# Patient Record
Sex: FEMALE | Race: WHITE | NOT HISPANIC OR LATINO | Employment: FULL TIME | URBAN - METROPOLITAN AREA
[De-identification: names, ages, dates, MRNs, and addresses within clinical notes are randomized per-mention and may not be internally consistent; named-entity substitution may affect disease eponyms.]

---

## 2023-05-30 ENCOUNTER — HOSPITAL ENCOUNTER (EMERGENCY)
Facility: HOSPITAL | Age: 33
Discharge: HOME | End: 2023-05-30
Attending: EMERGENCY MEDICINE | Admitting: EMERGENCY MEDICINE

## 2023-05-30 VITALS
DIASTOLIC BLOOD PRESSURE: 74 MMHG | WEIGHT: 293 LBS | SYSTOLIC BLOOD PRESSURE: 155 MMHG | TEMPERATURE: 98.2 F | BODY MASS INDEX: 43.4 KG/M2 | HEIGHT: 69 IN | RESPIRATION RATE: 20 BRPM | OXYGEN SATURATION: 98 % | HEART RATE: 79 BPM

## 2023-05-30 DIAGNOSIS — S61.211A LACERATION OF LEFT INDEX FINGER WITHOUT FOREIGN BODY WITHOUT DAMAGE TO NAIL, INITIAL ENCOUNTER: Primary | ICD-10-CM

## 2023-05-30 DIAGNOSIS — M79.645 FINGER PAIN, LEFT: ICD-10-CM

## 2023-05-30 PROCEDURE — 90471 IMMUNIZATION ADMIN: CPT | Performed by: NURSE PRACTITIONER

## 2023-05-30 PROCEDURE — 0 LIDOCAINE 1 % SOLUTION: Performed by: NURSE PRACTITIONER

## 2023-05-30 PROCEDURE — 25010000002 TETANUS-DIPHTH-ACELL PERTUSSIS 5-2.5-18.5 LF-MCG/0.5 SUSPENSION PREFILLED SYRINGE: Performed by: NURSE PRACTITIONER

## 2023-05-30 PROCEDURE — 99282 EMERGENCY DEPT VISIT SF MDM: CPT

## 2023-05-30 PROCEDURE — 90715 TDAP VACCINE 7 YRS/> IM: CPT | Performed by: NURSE PRACTITIONER

## 2023-05-30 RX ORDER — BUPIVACAINE HYDROCHLORIDE 2.5 MG/ML
10 INJECTION, SOLUTION INFILTRATION; PERINEURAL ONCE
Status: DISCONTINUED | OUTPATIENT
Start: 2023-05-30 | End: 2023-05-30

## 2023-05-30 RX ORDER — LIDOCAINE HYDROCHLORIDE 10 MG/ML
10 INJECTION, SOLUTION INFILTRATION; PERINEURAL ONCE
Status: COMPLETED | OUTPATIENT
Start: 2023-05-30 | End: 2023-05-30

## 2023-05-30 RX ADMIN — LIDOCAINE HYDROCHLORIDE 10 ML: 10 INJECTION, SOLUTION INFILTRATION; PERINEURAL at 13:29

## 2023-05-30 RX ADMIN — TETANUS TOXOID, REDUCED DIPHTHERIA TOXOID AND ACELLULAR PERTUSSIS VACCINE, ADSORBED 0.5 ML: 5; 2.5; 8; 8; 2.5 SUSPENSION INTRAMUSCULAR at 13:28

## 2023-05-30 NOTE — ED PROVIDER NOTES
Subjective   History of Present Illness  33-year-old female presents with laceration to the dorsum of the  MIP left index finger status post using a knife to cut off bristles of a comb while braiding her significant other's hair.  She denies paresthesia.  She reports that its been greater than 5 years since her last tetanus shot.    Primary care: Yaneth Gonzales NP        Review of Systems    No past medical history on file.    No Known Allergies    No past surgical history on file.    No family history on file.    Social History     Socioeconomic History   • Marital status: Single           Objective   Physical Exam  Vitals and nursing note reviewed.   Constitutional:       General: She is awake. She is not in acute distress.     Appearance: Normal appearance. She is well-developed. She is obese.   Cardiovascular:      Pulses: Normal pulses.           Radial pulses are 2+ on the right side and 2+ on the left side.   Musculoskeletal:         General: Tenderness and signs of injury present. No swelling or deformity. Normal range of motion.      Left hand: Laceration present. No swelling, deformity or bony tenderness. Normal strength. Normal sensation. There is no disruption of two-point discrimination. Normal capillary refill. Normal pulse.        Hands:    Skin:     General: Skin is warm and dry.      Capillary Refill: Capillary refill takes less than 2 seconds.      Coloration: Skin is not pale.   Neurological:      Mental Status: She is alert and oriented to person, place, and time.      Sensory: No sensory deficit.      Motor: No abnormal muscle tone.   Psychiatric:         Mood and Affect: Mood normal.         Behavior: Behavior normal. Behavior is cooperative.         Thought Content: Thought content normal.         Judgment: Judgment normal.         Laceration Repair    Date/Time: 5/30/2023 1:48 PM  Performed by: Lizabeth Moreno APRN  Authorized by: Naga Singh MD     Consent:     Consent obtained:  Verbal    " Consent given by:  Patient    Risks, benefits, and alternatives were discussed: yes      Risks discussed:  Pain    Alternatives discussed:  Referral  Holley protocol:     Procedure explained and questions answered to patient or proxy's satisfaction: yes      Patient identity confirmed:  Verbally with patient and arm band  Anesthesia:     Anesthesia method:  Nerve block    Block needle gauge:  27 G    Block anesthetic:  Lidocaine 1% w/o epi    Block injection procedure:  Introduced needle, incremental injection and anatomic landmarks identified    Block outcome:  Anesthesia achieved  Laceration details:     Location:  Finger    Finger location:  L index finger    Length (cm):  1  Pre-procedure details:     Preparation:  Patient was prepped and draped in usual sterile fashion  Exploration:     Hemostasis achieved with:  Direct pressure    Wound exploration: wound explored through full range of motion      Contaminated: no    Treatment:     Area cleansed with:  Chlorhexidine    Amount of cleaning:  Standard    Irrigation solution:  Sterile saline    Irrigation method:  Tap    Visualized foreign bodies/material removed: no    Skin repair:     Repair method:  Sutures    Suture size:  5-0    Suture material:  Nylon    Suture technique:  Simple interrupted    Number of sutures:  2  Approximation:     Approximation:  Close  Repair type:     Repair type:  Simple  Post-procedure details:     Dressing:  Antibiotic ointment and splint for protection    Procedure completion:  Tolerated well, no immediate complications               ED Course                                           Medical Decision Making  Risk  Prescription drug management.        Interpreted by radiologist as below:     No radiology results for the last day      /74   Pulse 79   Temp 98.2 °F (36.8 °C)   Resp 20   Ht 175.3 cm (69\")   Wt (!) 145 kg (320 lb 1.7 oz)   LMP 04/27/2023   SpO2 98%   BMI 47.27 kg/m²      Lab Results (last 24 hours)  "    ** No results found for the last 24 hours. **           Medications   Tetanus-Diphth-Acell Pertussis (BOOSTRIX) injection 0.5 mL (0.5 mL Intramuscular Given 5/30/23 1328)   lidocaine (XYLOCAINE) 1 % injection 10 mL (10 mL Infiltration Given 5/30/23 1329)        Patient undressed and placed in gown for exam.  Appropriate PPE worn during patient exam.  Appropriate monitoring initiated. Patient is alert and oriented x3.  No acute distress noted.  1 cm laceration noted to dorsum of the  MIP left index finger.  Motor function sensation intact.  No mallet deformity noted.  Bleeding controlled per prehospital bandage.  Tdap updated.  See procedure note for suture repair.  Patient was encouraged to cleanse twice daily with antibacterial soap and water and have sutures removed in 10 days.  A splint was applied for protection of sutures.    I reviewed chart nothing available for comparison.    Disposition/Treatment: Discussed results with patient, verbalized understanding. Discussed reasons to return to the ER, patient verbalized understanding. Agreeable with plan of care. Patient was stable upon discharge.    Upon reassessment, patient is flesh tone warm and dry no acute distress noted.  Vital signs are stable.    Part of this note may be an electronic transcription/translation of spoken language to printed text using the Dragon Dictation System.         Final diagnoses:   Laceration of left index finger without foreign body without damage to nail, initial encounter   Finger pain, left       ED Disposition  ED Disposition     ED Disposition   Discharge    Condition   Stable    Comment   --             Yaneth Gonzales, APRN  [address], [address]  [facility] [address] [phone]    Schedule an appointment as soon as possible for a visit in 10 days  For suture removal    [facility] EMERGENCY DEPARTMENT  [facility] [address] [phone]  Go to   If symptoms worsen          Medication List      No changes were made to your prescriptions during this visit.          Lizabeth Moreno, APRN  05/30/23 0722

## 2023-05-30 NOTE — DISCHARGE INSTRUCTIONS
Cleanse twice daily with antibacterial soap and water then apply bacitracin bandage.  Keep splint in place for protection.  Apply ice every 2 hours while awake, for 20 minutes.  Rotate Tylenol Motrin for pain.  Do not saturate in body of water such as dirty dish water, bath water, hot tub, swimming pool.  Monitor for signs of infection including but not limited to: Redness at the site, green or yellow drainage, fever of 101 or higher.  Have sutures removed in 10 days by primary care, urgent care, or return to the ER.

## 2024-07-14 ENCOUNTER — APPOINTMENT (OUTPATIENT)
Dept: GENERAL RADIOLOGY | Facility: HOSPITAL | Age: 34
End: 2024-07-14

## 2024-07-14 ENCOUNTER — APPOINTMENT (OUTPATIENT)
Dept: MRI IMAGING | Facility: HOSPITAL | Age: 34
End: 2024-07-14

## 2024-07-14 ENCOUNTER — HOSPITAL ENCOUNTER (INPATIENT)
Facility: HOSPITAL | Age: 34
LOS: 1 days | Discharge: HOME | End: 2024-07-15
Attending: EMERGENCY MEDICINE | Admitting: INTERNAL MEDICINE

## 2024-07-14 DIAGNOSIS — D72.829 LEUKOCYTOSIS, UNSPECIFIED TYPE: ICD-10-CM

## 2024-07-14 DIAGNOSIS — M54.41 ACUTE RIGHT-SIDED LOW BACK PAIN WITH RIGHT-SIDED SCIATICA: Primary | ICD-10-CM

## 2024-07-14 DIAGNOSIS — M54.16 LUMBAR RADICULOPATHY, CHRONIC: ICD-10-CM

## 2024-07-14 PROBLEM — M54.9 BACK PAIN: Status: ACTIVE | Noted: 2024-07-14

## 2024-07-14 LAB
ALBUMIN SERPL-MCNC: 4.2 G/DL (ref 3.5–5.2)
ALBUMIN/GLOB SERPL: 1.5 G/DL
ALP SERPL-CCNC: 55 U/L (ref 39–117)
ALT SERPL W P-5'-P-CCNC: 14 U/L (ref 1–33)
ANION GAP SERPL CALCULATED.3IONS-SCNC: 13.1 MMOL/L (ref 5–15)
AST SERPL-CCNC: 13 U/L (ref 1–32)
B-HCG UR QL: NEGATIVE
BACTERIA UR QL AUTO: ABNORMAL /HPF
BASOPHILS # BLD AUTO: 0.04 10*3/MM3 (ref 0–0.2)
BASOPHILS NFR BLD AUTO: 0.2 % (ref 0–1.5)
BILIRUB SERPL-MCNC: 0.4 MG/DL (ref 0–1.2)
BILIRUB UR QL STRIP: NEGATIVE
BUN SERPL-MCNC: 15 MG/DL (ref 6–20)
BUN/CREAT SERPL: 15.3 (ref 7–25)
CALCIUM SPEC-SCNC: 8.9 MG/DL (ref 8.6–10.5)
CHLORIDE SERPL-SCNC: 101 MMOL/L (ref 98–107)
CLARITY UR: ABNORMAL
CO2 SERPL-SCNC: 23.9 MMOL/L (ref 22–29)
COLOR UR: YELLOW
CREAT SERPL-MCNC: 0.98 MG/DL (ref 0.57–1)
CRP SERPL-MCNC: <0.3 MG/DL (ref 0–0.5)
D-LACTATE SERPL-SCNC: 1.4 MMOL/L (ref 0.3–2)
DEPRECATED RDW RBC AUTO: 47 FL (ref 37–54)
EGFRCR SERPLBLD CKD-EPI 2021: 76.9 ML/MIN/1.73
EOSINOPHIL # BLD AUTO: 0.08 10*3/MM3 (ref 0–0.4)
EOSINOPHIL NFR BLD AUTO: 0.4 % (ref 0.3–6.2)
ERYTHROCYTE [DISTWIDTH] IN BLOOD BY AUTOMATED COUNT: 13.9 % (ref 12.3–15.4)
ERYTHROCYTE [SEDIMENTATION RATE] IN BLOOD: 8 MM/HR (ref 0–20)
GLOBULIN UR ELPH-MCNC: 2.8 GM/DL
GLUCOSE SERPL-MCNC: 212 MG/DL (ref 65–99)
GLUCOSE UR STRIP-MCNC: NEGATIVE MG/DL
HCT VFR BLD AUTO: 45.4 % (ref 34–46.6)
HGB BLD-MCNC: 14.6 G/DL (ref 12–15.9)
HGB UR QL STRIP.AUTO: NEGATIVE
HYALINE CASTS UR QL AUTO: ABNORMAL /LPF
IMM GRANULOCYTES # BLD AUTO: 0.22 10*3/MM3 (ref 0–0.05)
IMM GRANULOCYTES NFR BLD AUTO: 1 % (ref 0–0.5)
KETONES UR QL STRIP: ABNORMAL
LEUKOCYTE ESTERASE UR QL STRIP.AUTO: ABNORMAL
LYMPHOCYTES # BLD AUTO: 2.45 10*3/MM3 (ref 0.7–3.1)
LYMPHOCYTES NFR BLD AUTO: 11.2 % (ref 19.6–45.3)
MCH RBC QN AUTO: 29.9 PG (ref 26.6–33)
MCHC RBC AUTO-ENTMCNC: 32.2 G/DL (ref 31.5–35.7)
MCV RBC AUTO: 92.8 FL (ref 79–97)
MONOCYTES # BLD AUTO: 0.91 10*3/MM3 (ref 0.1–0.9)
MONOCYTES NFR BLD AUTO: 4.1 % (ref 5–12)
MUCOUS THREADS URNS QL MICRO: ABNORMAL /HPF
NEUTROPHILS NFR BLD AUTO: 18.24 10*3/MM3 (ref 1.7–7)
NEUTROPHILS NFR BLD AUTO: 83.1 % (ref 42.7–76)
NITRITE UR QL STRIP: NEGATIVE
NRBC BLD AUTO-RTO: 0 /100 WBC (ref 0–0.2)
PH UR STRIP.AUTO: 5.5 [PH] (ref 5–8)
PLATELET # BLD AUTO: 305 10*3/MM3 (ref 140–450)
PMV BLD AUTO: 9.4 FL (ref 6–12)
POTASSIUM SERPL-SCNC: 3.8 MMOL/L (ref 3.5–5.2)
PROCALCITONIN SERPL-MCNC: 0.03 NG/ML (ref 0–0.25)
PROT SERPL-MCNC: 7 G/DL (ref 6–8.5)
PROT UR QL STRIP: ABNORMAL
RBC # BLD AUTO: 4.89 10*6/MM3 (ref 3.77–5.28)
RBC # UR STRIP: ABNORMAL /HPF
REF LAB TEST METHOD: ABNORMAL
SODIUM SERPL-SCNC: 138 MMOL/L (ref 136–145)
SP GR UR STRIP: 1.02 (ref 1–1.03)
SQUAMOUS #/AREA URNS HPF: ABNORMAL /HPF
UROBILINOGEN UR QL STRIP: ABNORMAL
WBC # UR STRIP: ABNORMAL /HPF
WBC NRBC COR # BLD AUTO: 21.94 10*3/MM3 (ref 3.4–10.8)

## 2024-07-14 PROCEDURE — 36415 COLL VENOUS BLD VENIPUNCTURE: CPT

## 2024-07-14 PROCEDURE — 72158 MRI LUMBAR SPINE W/O & W/DYE: CPT

## 2024-07-14 PROCEDURE — 99285 EMERGENCY DEPT VISIT HI MDM: CPT

## 2024-07-14 PROCEDURE — 72156 MRI NECK SPINE W/O & W/DYE: CPT

## 2024-07-14 PROCEDURE — 72157 MRI CHEST SPINE W/O & W/DYE: CPT

## 2024-07-14 PROCEDURE — 71045 X-RAY EXAM CHEST 1 VIEW: CPT

## 2024-07-14 PROCEDURE — 81001 URINALYSIS AUTO W/SCOPE: CPT | Performed by: EMERGENCY MEDICINE

## 2024-07-14 PROCEDURE — 87040 BLOOD CULTURE FOR BACTERIA: CPT | Performed by: EMERGENCY MEDICINE

## 2024-07-14 PROCEDURE — 81025 URINE PREGNANCY TEST: CPT | Performed by: EMERGENCY MEDICINE

## 2024-07-14 PROCEDURE — 25010000002 KETOROLAC TROMETHAMINE PER 15 MG: Performed by: EMERGENCY MEDICINE

## 2024-07-14 PROCEDURE — 25010000002 GADOTERIDOL PER 1 ML: Performed by: INTERNAL MEDICINE

## 2024-07-14 PROCEDURE — 85025 COMPLETE CBC W/AUTO DIFF WBC: CPT | Performed by: EMERGENCY MEDICINE

## 2024-07-14 PROCEDURE — 83605 ASSAY OF LACTIC ACID: CPT

## 2024-07-14 PROCEDURE — A9579 GAD-BASE MR CONTRAST NOS,1ML: HCPCS | Performed by: INTERNAL MEDICINE

## 2024-07-14 PROCEDURE — 25010000002 METHYLPREDNISOLONE PER 80 MG: Performed by: EMERGENCY MEDICINE

## 2024-07-14 PROCEDURE — 86140 C-REACTIVE PROTEIN: CPT | Performed by: INTERNAL MEDICINE

## 2024-07-14 PROCEDURE — 84145 PROCALCITONIN (PCT): CPT | Performed by: INTERNAL MEDICINE

## 2024-07-14 PROCEDURE — 85652 RBC SED RATE AUTOMATED: CPT | Performed by: EMERGENCY MEDICINE

## 2024-07-14 PROCEDURE — 80053 COMPREHEN METABOLIC PANEL: CPT | Performed by: EMERGENCY MEDICINE

## 2024-07-14 RX ORDER — ONDANSETRON 2 MG/ML
4 INJECTION INTRAMUSCULAR; INTRAVENOUS EVERY 6 HOURS PRN
Status: DISCONTINUED | OUTPATIENT
Start: 2024-07-14 | End: 2024-07-15 | Stop reason: HOSPADM

## 2024-07-14 RX ORDER — ONDANSETRON 4 MG/1
4 TABLET, ORALLY DISINTEGRATING ORAL EVERY 6 HOURS PRN
Status: DISCONTINUED | OUTPATIENT
Start: 2024-07-14 | End: 2024-07-15 | Stop reason: HOSPADM

## 2024-07-14 RX ORDER — ACETAMINOPHEN 160 MG/5ML
650 SOLUTION ORAL EVERY 4 HOURS PRN
Status: DISCONTINUED | OUTPATIENT
Start: 2024-07-14 | End: 2024-07-15 | Stop reason: HOSPADM

## 2024-07-14 RX ORDER — METHYLPREDNISOLONE ACETATE 80 MG/ML
80 INJECTION, SUSPENSION INTRA-ARTICULAR; INTRALESIONAL; INTRAMUSCULAR; SOFT TISSUE ONCE
Status: COMPLETED | OUTPATIENT
Start: 2024-07-14 | End: 2024-07-14

## 2024-07-14 RX ORDER — KETOROLAC TROMETHAMINE 30 MG/ML
15 INJECTION, SOLUTION INTRAMUSCULAR; INTRAVENOUS ONCE
Status: COMPLETED | OUTPATIENT
Start: 2024-07-14 | End: 2024-07-14

## 2024-07-14 RX ORDER — IBUPROFEN 200 MG
200 TABLET ORAL EVERY 6 HOURS PRN
COMMUNITY

## 2024-07-14 RX ORDER — SODIUM CHLORIDE 0.9 % (FLUSH) 0.9 %
10 SYRINGE (ML) INJECTION AS NEEDED
Status: DISCONTINUED | OUTPATIENT
Start: 2024-07-14 | End: 2024-07-15 | Stop reason: HOSPADM

## 2024-07-14 RX ORDER — PREDNISONE 20 MG/1
80 TABLET ORAL DAILY
Status: DISCONTINUED | OUTPATIENT
Start: 2024-07-15 | End: 2024-07-15 | Stop reason: HOSPADM

## 2024-07-14 RX ORDER — HYDRALAZINE HYDROCHLORIDE 20 MG/ML
10 INJECTION INTRAMUSCULAR; INTRAVENOUS EVERY 6 HOURS PRN
Status: DISCONTINUED | OUTPATIENT
Start: 2024-07-14 | End: 2024-07-15 | Stop reason: HOSPADM

## 2024-07-14 RX ORDER — BISACODYL 10 MG
10 SUPPOSITORY, RECTAL RECTAL DAILY PRN
Status: DISCONTINUED | OUTPATIENT
Start: 2024-07-14 | End: 2024-07-15 | Stop reason: HOSPADM

## 2024-07-14 RX ORDER — ACETAMINOPHEN 650 MG/1
650 SUPPOSITORY RECTAL EVERY 4 HOURS PRN
Status: DISCONTINUED | OUTPATIENT
Start: 2024-07-14 | End: 2024-07-15 | Stop reason: HOSPADM

## 2024-07-14 RX ORDER — BISACODYL 5 MG/1
5 TABLET, DELAYED RELEASE ORAL DAILY PRN
Status: DISCONTINUED | OUTPATIENT
Start: 2024-07-14 | End: 2024-07-15 | Stop reason: HOSPADM

## 2024-07-14 RX ORDER — PANTOPRAZOLE SODIUM 40 MG/1
40 TABLET, DELAYED RELEASE ORAL
Status: DISCONTINUED | OUTPATIENT
Start: 2024-07-15 | End: 2024-07-15 | Stop reason: HOSPADM

## 2024-07-14 RX ORDER — ACETAMINOPHEN 325 MG/1
650 TABLET ORAL EVERY 4 HOURS PRN
Status: DISCONTINUED | OUTPATIENT
Start: 2024-07-14 | End: 2024-07-15 | Stop reason: HOSPADM

## 2024-07-14 RX ORDER — NITROGLYCERIN 0.4 MG/1
0.4 TABLET SUBLINGUAL
Status: DISCONTINUED | OUTPATIENT
Start: 2024-07-14 | End: 2024-07-15 | Stop reason: HOSPADM

## 2024-07-14 RX ORDER — POLYETHYLENE GLYCOL 3350 17 G/17G
17 POWDER, FOR SOLUTION ORAL DAILY PRN
Status: DISCONTINUED | OUTPATIENT
Start: 2024-07-14 | End: 2024-07-15 | Stop reason: HOSPADM

## 2024-07-14 RX ORDER — SODIUM CHLORIDE 0.9 % (FLUSH) 0.9 %
10 SYRINGE (ML) INJECTION EVERY 12 HOURS SCHEDULED
Status: DISCONTINUED | OUTPATIENT
Start: 2024-07-14 | End: 2024-07-15 | Stop reason: HOSPADM

## 2024-07-14 RX ORDER — HYDROCODONE BITARTRATE AND ACETAMINOPHEN 5; 325 MG/1; MG/1
1 TABLET ORAL EVERY 6 HOURS PRN
Status: DISCONTINUED | OUTPATIENT
Start: 2024-07-14 | End: 2024-07-15 | Stop reason: HOSPADM

## 2024-07-14 RX ORDER — ALUMINA, MAGNESIA, AND SIMETHICONE 2400; 2400; 240 MG/30ML; MG/30ML; MG/30ML
15 SUSPENSION ORAL EVERY 6 HOURS PRN
Status: DISCONTINUED | OUTPATIENT
Start: 2024-07-14 | End: 2024-07-15 | Stop reason: HOSPADM

## 2024-07-14 RX ORDER — ACETAMINOPHEN 325 MG/1
650 TABLET ORAL EVERY 6 HOURS PRN
COMMUNITY

## 2024-07-14 RX ORDER — AMOXICILLIN 250 MG
2 CAPSULE ORAL 2 TIMES DAILY PRN
Status: DISCONTINUED | OUTPATIENT
Start: 2024-07-14 | End: 2024-07-15 | Stop reason: HOSPADM

## 2024-07-14 RX ORDER — SODIUM CHLORIDE 9 MG/ML
40 INJECTION, SOLUTION INTRAVENOUS AS NEEDED
Status: DISCONTINUED | OUTPATIENT
Start: 2024-07-14 | End: 2024-07-15 | Stop reason: HOSPADM

## 2024-07-14 RX ORDER — AMLODIPINE BESYLATE 5 MG/1
5 TABLET ORAL
Status: DISCONTINUED | OUTPATIENT
Start: 2024-07-14 | End: 2024-07-14

## 2024-07-14 RX ADMIN — GADOTERIDOL 20 ML: 279.3 INJECTION, SOLUTION INTRAVENOUS at 16:59

## 2024-07-14 RX ADMIN — KETOROLAC TROMETHAMINE 15 MG: 30 INJECTION, SOLUTION INTRAMUSCULAR at 14:33

## 2024-07-14 RX ADMIN — HYDROCODONE BITARTRATE AND ACETAMINOPHEN 1 TABLET: 5; 325 TABLET ORAL at 23:10

## 2024-07-14 RX ADMIN — AMLODIPINE BESYLATE 5 MG: 5 TABLET ORAL at 18:19

## 2024-07-14 RX ADMIN — ACETAMINOPHEN 650 MG: 325 TABLET, FILM COATED ORAL at 18:19

## 2024-07-14 RX ADMIN — Medication 10 ML: at 21:09

## 2024-07-14 RX ADMIN — METHYLPREDNISOLONE ACETATE 80 MG: 80 INJECTION, SUSPENSION INTRA-ARTICULAR; INTRALESIONAL; INTRAMUSCULAR; SOFT TISSUE at 14:30

## 2024-07-14 NOTE — PLAN OF CARE
Goal Outcome Evaluation:      Pt alert and oriented x 4. Independent. Pain level 8,  administered tylenol.

## 2024-07-14 NOTE — Clinical Note
Level of Care: Telemetry [5]   Diagnosis: Back pain [313759]   Admitting Physician: JEWELL WHITNEY [[id number]]   Attending Physician: JEWELL WHITNEY [[id number]]   Certification: I Certify That Inpatient Hospital Services Are Medically Necessary For Greater Than 2 Midnights

## 2024-07-14 NOTE — H&P
History and Physical   Teena Telles : 1990 MRN:[id number] LOS:0     Reason for admission: Back pain     Assessment / Plan     #Right sided back pain with RLE parasthesia  #Neck pain with RUE parasthesia  -pt is with underlying sciatica but recently it has been worsened to the point she could not able to walk because of pain  -no fever chills rigors reported   -leucocytosis present   -CRP and procal pending   -CXR with no acute changes.   -MRI spine ordered pending     Code Status (Patient has no pulse and is not breathing): CPR (Attempt to Resuscitate)  Medical Interventions (Patient has pulse or is breathing): Full Support       Nutrition: Diet: Regular/House; Fluid Consistency: Thin (IDDSI 0)     DVT Prophylaxis: [unfilled]     History of Present illness     A 34 y.o. old female patient with PMH of sciatica presents to the hospital with complaints of excruciating back pain along with RLE pain to the point difficult to walk. She has underlying sciatica on and off. No fever chills rigors. No bladder and bowel dysfunction. She has neck pain and RUE numbness and tingling.     Will admit for radiculopathy pain.     Subjective / Review of systems     Review of Systems   Back pain right side and RLE pain     Past Medical/Surgical/Social/Family History & Allergies   No past medical history on file.   No past surgical history on file.   Social History     Socioeconomic History    Marital status: Single      No family history on file.   Allergies   Allergen Reactions    Metformin Diarrhea        Home Medications     Prior to Admission medications    Medication Sig Start Date End Date Taking? Authorizing Provider   acetaminophen (TYLENOL) 325 MG tablet Take 2 tablets by mouth Every 6 (Six) Hours As Needed for Mild Pain.    ProviderEleni MD   ibuprofen (ADVIL,MOTRIN) 200 MG tablet Take 1 tablet by mouth Every 6 (Six) Hours As Needed for Mild Pain.    ProviderEleni MD      Objective / Physical  Exam   Vital signs:  Temp: 98.5 °F (36.9 °C)  BP: 156/95  Heart Rate: 91  Resp: 16  SpO2: 97 %  Weight: (!) 148 kg (325 lb 6.4 oz)    Admission Weight: Weight: (!) 148 kg (325 lb 6.4 oz)    Physical Exam   Physical Exam  HENT:      Head: Normocephalic and atraumatic.      Nose: Nose normal.   Eyes:      Extraocular Movements: Extraocular movements intact.      Conjunctivae/sclera: Conjunctivae normal.      Pupils: Pupils are equal, round, and reactive to light.   Cardiovascular:      Rate and Rhythm: normal       Pulses: Normal pulses.      Heart sounds: Normal heart sounds.   Pulmonary:      Effort: normal      Breath sounds: normal   Abdominal:      General: Abdomen is flat. Bowel sounds are normal.      Palpations: Abdomen is soft.   Musculoskeletal:         Right back tenderness        Labs     Results from last 7 days   Lab Units 07/14/24  1425   WBC 10*3/mm3 21.94*   HEMATOCRIT % 45.4   PLATELETS 10*3/mm3 305      Results from last 7 days   Lab Units 07/14/24  1425   SODIUM mmol/L 138   POTASSIUM mmol/L 3.8   CHLORIDE mmol/L 101   CO2 mmol/L 23.9   BUN mg/dL 15   CREATININE mg/dL 0.98        Current Medications   Scheduled Meds:amLODIPine, 5 mg, Oral, Q24H  sodium chloride, 10 mL, Intravenous, Q12H         Continuous Infusions:      Tripp Benavides MD  [facility]   07/14/24   16:00 EDT

## 2024-07-14 NOTE — ED PROVIDER NOTES
Subjective   History of Present Illness  Complaint back pain    History of present 34-year-old female about 2-week history of progressively worsening low back pain rates into her right buttock and down her right leg numbness to the right leg.  She denies any injury.  It is worse with movement better with rest.  Trouble walking.  No loss of bladder or bowel function although she has been constipated.  And feels like she is urinating frequently.  No black or bloody stool no bloody urine.  She has had subjective fever and chills and sweats she denies any recent injury illness flus viruses vaccinations procedures she has no history of drug use or recent antibiotic use.  No weight loss has been noted.  No abdominal pain no vomiting or diarrhea or.  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  No vaginal discharge or pelvic pain or worry of STD.  No pregnancy concerns.      Review of Systems   Constitutional:  Positive for chills. Negative for fever.   HENT:  Negative for congestion.    Respiratory:  Negative for chest tightness and shortness of breath.    Cardiovascular:  Negative for chest pain.   Gastrointestinal:  Negative for abdominal pain and vomiting.   Genitourinary:  Negative for difficulty urinating, dysuria and vaginal discharge.   Musculoskeletal:  Positive for back pain and neck pain.   Skin:  Negative for rash and wound.   Neurological:  Positive for weakness. Negative for dizziness and light-headedness.   Psychiatric/Behavioral:  Negative for confusion.        No past medical history on file.    Allergies   Allergen Reactions    Metformin Diarrhea       No past surgical history on file.    No family history on file.    Social History     Socioeconomic History    Marital status: Single   No tobacco alcohol or drug use    Prior to Admission medications    Medication Sig Start Date End Date Taking? Authorizing Provider   acetaminophen  (TYLENOL) 325 MG tablet Take 2 tablets by mouth Every 6 (Six) Hours As Needed for Mild Pain.    Provider, MD Eleni   ibuprofen (ADVIL,MOTRIN) 200 MG tablet Take 1 tablet by mouth Every 6 (Six) Hours As Needed for Mild Pain.    Provider, MD Eleni        Objective   Physical Exam  Constitutional 34-year-old awake alert no acute distress triage vital signs reviewed.  HEENT unremarkable sclera clear mouth clear neck supple no adenopathy no JVD no meningeal signs lungs clear no retraction no use of accessory heart regular without murmur rub abdomen soft nontender good bowel sounds no peritoneal findings or other masses.  Extremities pulses equal throughout upper and lower extremities no edema no cords no Homans' sign no evidence of DVT skin is warm and dry no rashes or cellulitic changes back no direct cervical thoracic lumbar spine tenderness noted.  She has some paralumbar tenderness in the right base not red or hot there is no redness to the buttock area no as signs of an abscess or cellulitis in the buttock area good sensation no peritoneal numbness or sacral numbness.  Patient has some prostrate leg testing on the right but negative on the left.  Toes under including big toe dorsiflex plantarflex at difficulty reflexes 2+ in the left knee and ankle 1+ in the right ankle and diminished 1+ in the right knee.  No cords or Homans' sign no redness or swelling to the leg no red hot swollen hip joint.  Full range of motion to hip no evidence of septic joint no cords or Homans' sign compartments are soft palpation no fluctuance or crepitus throughout the hip or buttock area suggesting abscess.  And distally neurovascular motor sensor intact neurologic awake alert and follows commands motor strength normal no facial asymmetry speech normal reflexes 2+ symmetric throughout bicep tricep and wrist.  Motor strength all normal in the upper extremities she able walk without difficulty.  Procedures           ED Course       Results for orders placed or performed during the hospital encounter of 07/14/24   Comprehensive Metabolic Panel    Specimen: Blood   Result Value Ref Range    Glucose 212 (H) 65 - 99 mg/dL    BUN 15 6 - 20 mg/dL    Creatinine 0.98 0.57 - 1.00 mg/dL    Sodium 138 136 - 145 mmol/L    Potassium 3.8 3.5 - 5.2 mmol/L    Chloride 101 98 - 107 mmol/L    CO2 23.9 22.0 - 29.0 mmol/L    Calcium 8.9 8.6 - 10.5 mg/dL    Total Protein 7.0 6.0 - 8.5 g/dL    Albumin 4.2 3.5 - 5.2 g/dL    ALT (SGPT) 14 1 - 33 U/L    AST (SGOT) 13 1 - 32 U/L    Alkaline Phosphatase 55 39 - 117 U/L    Total Bilirubin 0.4 0.0 - 1.2 mg/dL    Globulin 2.8 gm/dL    A/G Ratio 1.5 g/dL    BUN/Creatinine Ratio 15.3 7.0 - 25.0    Anion Gap 13.1 5.0 - 15.0 mmol/L    eGFR 76.9 >60.0 mL/min/1.73   Pregnancy, Urine - Urine, Clean Catch    Specimen: Urine, Clean Catch   Result Value Ref Range    HCG, Urine QL Negative Negative   Urinalysis With Microscopic If Indicated (No Culture) - Urine, Clean Catch    Specimen: Urine, Clean Catch   Result Value Ref Range    Color, UA Yellow Yellow, Straw    Appearance, UA Cloudy (A) Clear    pH, UA 5.5 5.0 - 8.0    Specific Gravity, UA 1.025 1.005 - 1.030    Glucose, UA Negative Negative    Ketones, UA Trace (A) Negative    Bilirubin, UA Negative Negative    Blood, UA Negative Negative    Protein, UA Trace (A) Negative    Leuk Esterase, UA Trace (A) Negative    Nitrite, UA Negative Negative    Urobilinogen, UA 1.0 E.U./dL 0.2 - 1.0 E.U./dL   Sedimentation Rate    Specimen: Blood   Result Value Ref Range    Sed Rate 8 0 - 20 mm/hr   CBC Auto Differential    Specimen: Blood   Result Value Ref Range    WBC 21.94 (H) 3.40 - 10.80 10*3/mm3    RBC 4.89 3.77 - 5.28 10*6/mm3    Hemoglobin 14.6 12.0 - 15.9 g/dL    Hematocrit 45.4 34.0 - 46.6 %    MCV 92.8 79.0 - 97.0 fL    MCH 29.9 26.6 - 33.0 pg    MCHC 32.2 31.5 - 35.7 g/dL    RDW 13.9 12.3 - 15.4 %    RDW-SD 47.0 37.0 - 54.0 fl    MPV 9.4 6.0 - 12.0 fL    Platelets 305 140 -  450 10*3/mm3    Neutrophil % 83.1 (H) 42.7 - 76.0 %    Lymphocyte % 11.2 (L) 19.6 - 45.3 %    Monocyte % 4.1 (L) 5.0 - 12.0 %    Eosinophil % 0.4 0.3 - 6.2 %    Basophil % 0.2 0.0 - 1.5 %    Immature Grans % 1.0 (H) 0.0 - 0.5 %    Neutrophils, Absolute 18.24 (H) 1.70 - 7.00 10*3/mm3    Lymphocytes, Absolute 2.45 0.70 - 3.10 10*3/mm3    Monocytes, Absolute 0.91 (H) 0.10 - 0.90 10*3/mm3    Eosinophils, Absolute 0.08 0.00 - 0.40 10*3/mm3    Basophils, Absolute 0.04 0.00 - 0.20 10*3/mm3    Immature Grans, Absolute 0.22 (H) 0.00 - 0.05 10*3/mm3    nRBC 0.0 0.0 - 0.2 /100 WBC   Urinalysis, Microscopic Only - Urine, Clean Catch    Specimen: Urine, Clean Catch   Result Value Ref Range    RBC, UA None Seen None Seen, 0-2 /HPF    WBC, UA None Seen None Seen, 0-2 /HPF    Bacteria, UA Trace (A) None Seen /HPF    Squamous Epithelial Cells, UA 7-12 (A) None Seen, 0-2 /HPF    Hyaline Casts, UA None Seen None Seen /LPF    Mucus, UA Small/1+ (A) None Seen, Trace /HPF    Methodology Manual Light Microscopy      No radiology results for the last day  Medications   sodium chloride 0.9 % flush 10 mL (has no administration in time range)   sodium chloride 0.9 % flush 10 mL (has no administration in time range)   sodium chloride 0.9 % flush 10 mL (has no administration in time range)   sodium chloride 0.9 % infusion 40 mL (has no administration in time range)   nitroglycerin (NITROSTAT) SL tablet 0.4 mg (has no administration in time range)   Potassium Replacement - Follow Nurse / BPA Driven Protocol (has no administration in time range)   Magnesium Standard Dose Replacement - Follow Nurse / BPA Driven Protocol (has no administration in time range)   Phosphorus Replacement - Follow Nurse / BPA Driven Protocol (has no administration in time range)   Calcium Replacement - Follow Nurse / BPA Driven Protocol (has no administration in time range)   acetaminophen (TYLENOL) tablet 650 mg (has no administration in time range)     Or    acetaminophen (TYLENOL) 160 MG/5ML oral solution 650 mg (has no administration in time range)     Or   acetaminophen (TYLENOL) suppository 650 mg (has no administration in time range)   sennosides-docusate (PERICOLACE) 8.6-50 MG per tablet 2 tablet (has no administration in time range)     And   polyethylene glycol (MIRALAX) packet 17 g (has no administration in time range)     And   bisacodyl (DULCOLAX) EC tablet 5 mg (has no administration in time range)     And   bisacodyl (DULCOLAX) suppository 10 mg (has no administration in time range)   melatonin tablet 5 mg (has no administration in time range)   ondansetron ODT (ZOFRAN-ODT) disintegrating tablet 4 mg (has no administration in time range)     Or   ondansetron (ZOFRAN) injection 4 mg (has no administration in time range)   aluminum-magnesium hydroxide-simethicone (MAALOX MAX) 400-400-40 MG/5ML suspension 15 mL (has no administration in time range)   HYDROmorphone (DILAUDID) injection 1 mg (has no administration in time range)   HYDROcodone-acetaminophen (NORCO) 5-325 MG per tablet 1 tablet (has no administration in time range)   ketorolac (TORADOL) injection 15 mg (15 mg Intravenous Given 7/14/24 1433)   methylPREDNISolone acetate (DEPO-medrol) injection 80 mg (80 mg Intramuscular Given 7/14/24 1430)                                              Medical Decision Making  Medical decision making.  IV established monitor placement review of sinus rhythm patient was given Toradol 15 mg IV and Depo-Medrol 80 mg IM.  Labs obtained my independent review comprehensive metabolic profile unremarkable other blood sugar 212.  Pregnancy test negative urine negative sed rate was normal but white count was 21.94.  Last steroid dose was almost 2 weeks ago added blood cultures lactate was normal.  All labs independent reviewed by me.  Patient has an elevated white count I did a chest x-ray independent reviewed by me no pneumonia pneumothorax or acute findings.  I do not see  any other source of infection including I do not see pneumonia I do not see any evidence of UTI I do not see evidence of an abscess in her back or buttock area pelvic abscess or cellulitis meningitis encephalitis she has had no respiratory symptoms I see no other source of infection based on my history and physical and clinical findings currently patient will need MRI of the entire spine to rule out an abscess throughout the spine causing this pain and radiculopathy and elevated white count.  MRIs have been ordered this is pending I talked to the hospitalist I talked to the patient we discussed the case and patient will be admitted to hospital for further care stable otherwise unremarkable ER course.    Problems Addressed:  Acute right-sided low back pain with right-sided sciatica: complicated acute illness or injury  Leukocytosis, unspecified type: complicated acute illness or injury    Amount and/or Complexity of Data Reviewed  Labs: ordered. Decision-making details documented in ED Course.  Radiology: ordered.    Risk  Decision regarding hospitalization.        Final diagnoses:   Acute right-sided low back pain with right-sided sciatica   Leukocytosis, unspecified type       ED Disposition  ED Disposition       ED Disposition   Decision to Admit    Condition   --    Comment   Level of Care: Med/Surg [1]   Admitting Physician: JEWELL WHITNEY [[id number]]   Attending Physician: JEWELL WHITNEY [[id number]]                 No follow-up provider specified.       Medication List      No changes were made to your prescriptions during this visit.            Bobby Anderson MD  07/14/24 4217

## 2024-07-15 ENCOUNTER — APPOINTMENT (OUTPATIENT)
Dept: PAIN MEDICINE | Facility: HOSPITAL | Age: 34
End: 2024-07-15

## 2024-07-15 ENCOUNTER — TELEPHONE (OUTPATIENT)
Dept: NEUROSURGERY | Facility: CLINIC | Age: 34
End: 2024-07-15

## 2024-07-15 VITALS
TEMPERATURE: 97.1 F | HEIGHT: 69 IN | HEART RATE: 78 BPM | WEIGHT: 293 LBS | DIASTOLIC BLOOD PRESSURE: 96 MMHG | OXYGEN SATURATION: 97 % | SYSTOLIC BLOOD PRESSURE: 153 MMHG | RESPIRATION RATE: 16 BRPM | BODY MASS INDEX: 43.4 KG/M2

## 2024-07-15 PROBLEM — M54.16 LUMBAR RADICULOPATHY, CHRONIC: Status: ACTIVE | Noted: 2024-07-15

## 2024-07-15 PROBLEM — M54.31 SCIATIC PAIN, RIGHT: Status: ACTIVE | Noted: 2024-07-15

## 2024-07-15 LAB
ANION GAP SERPL CALCULATED.3IONS-SCNC: 8.1 MMOL/L (ref 5–15)
BASOPHILS # BLD AUTO: 0.03 10*3/MM3 (ref 0–0.2)
BASOPHILS NFR BLD AUTO: 0.1 % (ref 0–1.5)
BUN SERPL-MCNC: 20 MG/DL (ref 6–20)
BUN/CREAT SERPL: 23.5 (ref 7–25)
CALCIUM SPEC-SCNC: 8.4 MG/DL (ref 8.6–10.5)
CHLORIDE SERPL-SCNC: 105 MMOL/L (ref 98–107)
CO2 SERPL-SCNC: 24.9 MMOL/L (ref 22–29)
CREAT SERPL-MCNC: 0.85 MG/DL (ref 0.57–1)
DEPRECATED RDW RBC AUTO: 47.8 FL (ref 37–54)
EGFRCR SERPLBLD CKD-EPI 2021: 91.2 ML/MIN/1.73
EOSINOPHIL # BLD AUTO: 0.06 10*3/MM3 (ref 0–0.4)
EOSINOPHIL NFR BLD AUTO: 0.3 % (ref 0.3–6.2)
ERYTHROCYTE [DISTWIDTH] IN BLOOD BY AUTOMATED COUNT: 14 % (ref 12.3–15.4)
GLUCOSE SERPL-MCNC: 136 MG/DL (ref 65–99)
HCT VFR BLD AUTO: 37.6 % (ref 34–46.6)
HGB BLD-MCNC: 12.2 G/DL (ref 12–15.9)
IMM GRANULOCYTES # BLD AUTO: 0.17 10*3/MM3 (ref 0–0.05)
IMM GRANULOCYTES NFR BLD AUTO: 0.8 % (ref 0–0.5)
LYMPHOCYTES # BLD AUTO: 4.3 10*3/MM3 (ref 0.7–3.1)
LYMPHOCYTES NFR BLD AUTO: 21.3 % (ref 19.6–45.3)
MAGNESIUM SERPL-MCNC: 2.4 MG/DL (ref 1.6–2.6)
MCH RBC QN AUTO: 30.1 PG (ref 26.6–33)
MCHC RBC AUTO-ENTMCNC: 32.4 G/DL (ref 31.5–35.7)
MCV RBC AUTO: 92.8 FL (ref 79–97)
MONOCYTES # BLD AUTO: 1.07 10*3/MM3 (ref 0.1–0.9)
MONOCYTES NFR BLD AUTO: 5.3 % (ref 5–12)
NEUTROPHILS NFR BLD AUTO: 14.6 10*3/MM3 (ref 1.7–7)
NEUTROPHILS NFR BLD AUTO: 72.2 % (ref 42.7–76)
NRBC BLD AUTO-RTO: 0 /100 WBC (ref 0–0.2)
PHOSPHATE SERPL-MCNC: 4.3 MG/DL (ref 2.5–4.5)
PLATELET # BLD AUTO: 277 10*3/MM3 (ref 140–450)
PMV BLD AUTO: 9.6 FL (ref 6–12)
POTASSIUM SERPL-SCNC: 4.4 MMOL/L (ref 3.5–5.2)
RBC # BLD AUTO: 4.05 10*6/MM3 (ref 3.77–5.28)
SODIUM SERPL-SCNC: 138 MMOL/L (ref 136–145)
WBC NRBC COR # BLD AUTO: 20.23 10*3/MM3 (ref 3.4–10.8)

## 2024-07-15 PROCEDURE — 83735 ASSAY OF MAGNESIUM: CPT | Performed by: INTERNAL MEDICINE

## 2024-07-15 PROCEDURE — 84100 ASSAY OF PHOSPHORUS: CPT | Performed by: INTERNAL MEDICINE

## 2024-07-15 PROCEDURE — 80048 BASIC METABOLIC PNL TOTAL CA: CPT | Performed by: INTERNAL MEDICINE

## 2024-07-15 PROCEDURE — 77003 FLUOROGUIDE FOR SPINE INJECT: CPT

## 2024-07-15 PROCEDURE — 25010000002 BUPIVACAINE (PF) 0.25 % SOLUTION: Performed by: STUDENT IN AN ORGANIZED HEALTH CARE EDUCATION/TRAINING PROGRAM

## 2024-07-15 PROCEDURE — 99254 IP/OBS CNSLTJ NEW/EST MOD 60: CPT

## 2024-07-15 PROCEDURE — 97161 PT EVAL LOW COMPLEX 20 MIN: CPT

## 2024-07-15 PROCEDURE — 63710000001 PREDNISONE PER 1 MG: Performed by: INTERNAL MEDICINE

## 2024-07-15 PROCEDURE — 62323 NJX INTERLAMINAR LMBR/SAC: CPT | Performed by: STUDENT IN AN ORGANIZED HEALTH CARE EDUCATION/TRAINING PROGRAM

## 2024-07-15 PROCEDURE — B01B1ZZ FLUOROSCOPY OF SPINAL CORD USING LOW OSMOLAR CONTRAST: ICD-10-PCS | Performed by: STUDENT IN AN ORGANIZED HEALTH CARE EDUCATION/TRAINING PROGRAM

## 2024-07-15 PROCEDURE — 85025 COMPLETE CBC W/AUTO DIFF WBC: CPT | Performed by: INTERNAL MEDICINE

## 2024-07-15 PROCEDURE — 25010000002 METHYLPREDNISOLONE PER 40 MG: Performed by: STUDENT IN AN ORGANIZED HEALTH CARE EDUCATION/TRAINING PROGRAM

## 2024-07-15 PROCEDURE — 3E0R33Z INTRODUCTION OF ANTI-INFLAMMATORY INTO SPINAL CANAL, PERCUTANEOUS APPROACH: ICD-10-PCS | Performed by: STUDENT IN AN ORGANIZED HEALTH CARE EDUCATION/TRAINING PROGRAM

## 2024-07-15 PROCEDURE — 25510000001 IOPAMIDOL 41 % SOLUTION: Performed by: STUDENT IN AN ORGANIZED HEALTH CARE EDUCATION/TRAINING PROGRAM

## 2024-07-15 RX ORDER — IOPAMIDOL 408 MG/ML
3 INJECTION, SOLUTION INTRATHECAL
Status: COMPLETED | OUTPATIENT
Start: 2024-07-15 | End: 2024-07-15

## 2024-07-15 RX ORDER — HYDROCODONE BITARTRATE AND ACETAMINOPHEN 5; 325 MG/1; MG/1
1 TABLET ORAL EVERY 6 HOURS PRN
Qty: 12 TABLET | Refills: 0 | Status: SHIPPED | OUTPATIENT
Start: 2024-07-15 | End: 2024-07-18

## 2024-07-15 RX ORDER — LIDOCAINE HYDROCHLORIDE 10 MG/ML
1 INJECTION, SOLUTION INFILTRATION; PERINEURAL ONCE
Status: DISCONTINUED | OUTPATIENT
Start: 2024-07-15 | End: 2024-07-15 | Stop reason: HOSPADM

## 2024-07-15 RX ORDER — METHYLPREDNISOLONE ACETATE 40 MG/ML
40 INJECTION, SUSPENSION INTRA-ARTICULAR; INTRALESIONAL; INTRAMUSCULAR; SOFT TISSUE ONCE
Status: COMPLETED | OUTPATIENT
Start: 2024-07-15 | End: 2024-07-15

## 2024-07-15 RX ORDER — BUPIVACAINE HYDROCHLORIDE 2.5 MG/ML
10 INJECTION, SOLUTION EPIDURAL; INFILTRATION; INTRACAUDAL ONCE
Status: COMPLETED | OUTPATIENT
Start: 2024-07-15 | End: 2024-07-15

## 2024-07-15 RX ORDER — METHYLPREDNISOLONE 4 MG/1
TABLET ORAL
Qty: 21 TABLET | Refills: 0 | Status: SHIPPED | OUTPATIENT
Start: 2024-07-15

## 2024-07-15 RX ADMIN — PREDNISONE 80 MG: 20 TABLET ORAL at 10:07

## 2024-07-15 RX ADMIN — PANTOPRAZOLE SODIUM 40 MG: 40 TABLET, DELAYED RELEASE ORAL at 05:24

## 2024-07-15 RX ADMIN — IOPAMIDOL 3 ML: 408 INJECTION, SOLUTION INTRATHECAL at 14:05

## 2024-07-15 RX ADMIN — BUPIVACAINE HYDROCHLORIDE 4 ML: 2.5 INJECTION, SOLUTION EPIDURAL; INFILTRATION; INTRACAUDAL; PERINEURAL at 14:05

## 2024-07-15 RX ADMIN — Medication 10 ML: at 10:07

## 2024-07-15 RX ADMIN — METHYLPREDNISOLONE ACETATE 40 MG: 40 INJECTION, SUSPENSION INTRA-ARTICULAR; INTRALESIONAL; INTRAMUSCULAR; INTRASYNOVIAL; SOFT TISSUE at 14:05

## 2024-07-15 NOTE — TELEPHONE ENCOUNTER
Please set up appointment for patient to see [facility] ASAP once discharged.  Thank you

## 2024-07-15 NOTE — SIGNIFICANT NOTE
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 No abnormal enhancement or evidence of infection     Spine surgeon consulted and nursing team is texted to page the oncall team stat.   Steroids was given in the ED.       Tripp Benavides MD  Hospitalist  07/14/24   20:36 EDT

## 2024-07-15 NOTE — PLAN OF CARE
Problem: Adult Inpatient Plan of Care  Goal: Plan of Care Review  Outcome: Ongoing, Progressing  Goal: Patient-Specific Goal (Individualized)  Outcome: Ongoing, Progressing  Goal: Absence of Hospital-Acquired Illness or Injury  Outcome: Ongoing, Progressing  Intervention: Identify and Manage Fall Risk  Recent Flowsheet Documentation  Taken 7/15/2024 1228 by Gilda Leahy RN  Safety Promotion/Fall Prevention:   assistive device/personal items within reach   clutter free environment maintained   nonskid shoes/slippers when out of bed   room organization consistent   safety round/check completed  Taken 7/15/2024 1000 by Gilda Leahy RN  Safety Promotion/Fall Prevention:   assistive device/personal items within reach   clutter free environment maintained   nonskid shoes/slippers when out of bed   room organization consistent   safety round/check completed  Taken 7/15/2024 0845 by Gilda Leahy RN  Safety Promotion/Fall Prevention:   assistive device/personal items within reach   clutter free environment maintained   nonskid shoes/slippers when out of bed   room organization consistent   safety round/check completed  Intervention: Prevent Skin Injury  Recent Flowsheet Documentation  Taken 7/15/2024 0845 by Gilda Leahy RN  Skin Protection:   adhesive use limited   tubing/devices free from skin contact  Intervention: Prevent and Manage VTE (Venous Thromboembolism) Risk  Recent Flowsheet Documentation  Taken 7/15/2024 1228 by Gilda Leahy RN  Range of Motion: active ROM (range of motion) encouraged  Taken 7/15/2024 0845 by Gilda Leahy RN  VTE Prevention/Management:   bilateral   sequential compression devices off   patient refused intervention  Range of Motion: active ROM (range of motion) encouraged  Intervention: Prevent Infection  Recent Flowsheet Documentation  Taken 7/15/2024 1228 by Gilda Leahy RN  Infection Prevention: hand hygiene promoted  Taken 7/15/2024 1000 by  Gilda Leahy RN  Infection Prevention: hand hygiene promoted  Taken 7/15/2024 0845 by Gilda Leahy RN  Infection Prevention: hand hygiene promoted  Goal: Optimal Comfort and Wellbeing  Outcome: Ongoing, Progressing  Intervention: Provide Person-Centered Care  Recent Flowsheet Documentation  Taken 7/15/2024 1228 by Gilda Leahy RN  Trust Relationship/Rapport:   care explained   thoughts/feelings acknowledged  Taken 7/15/2024 0845 by Gilda Leahy RN  Trust Relationship/Rapport:   care explained   thoughts/feelings acknowledged  Goal: Readiness for Transition of Care  Outcome: Ongoing, Progressing   Goal Outcome Evaluation:

## 2024-07-15 NOTE — PROCEDURES
Lumbar Epidural Steroid Injection  [facility]    PREOPERATIVE DIAGNOSIS:   Chronic low back pain, Lumbar Disc Displacement, and Lumbar Radiculopathy  POSTOPERATIVE DIAGNOSIS:  Same as preop diagnosis    PROCEDURE:   Lumbar Epidural Steroid Injection, Therapeutic Translaminar Injection, with epidurogram, at  L5/S1 level    PRE-PROCEDURE DISCUSSION WITH PATIENT:    Risks and complications were discussed with the patient prior to starting the procedure and informed consent was obtained.  We discussed various topics including but not limited to bleeding, infection, injury, paralysis, nerve injury, dural puncture, coma, death, worsening of clinical picture, lack of pain relief, and postprocedural soreness.    SURGEON:  Jean Ratliff MD    REASON FOR PROCEDURE:    Degenerative changes are noted in the area., Stenotic area is noted, and is likely contributing to this chronic &/or recurrent pain. , and Radicular pain pattern seems consistent with this dermatome.    SEDATION:  Patient declined administration of moderate sedation    ANESTHETIC:  Marcaine 0.25%  STEROID:   Methylprednisolone (DEPO MEDROL) 40mg/ml    DESCRIPTON OF PROCEDURE:    After obtaining informed consent, I.V. was not started in the preop area.   The patient was taken to the operating room and placed in the prone position. All pressure points were well padded.  The lumbar spine area was prepped with Chloraprep and draped in a sterile fashion.  Under fluoroscopic guidance, the above mentioned interlaminar space was identified. Skin and subcutaneous tissues were anesthetized with 1% lidocaine in the middle of the space. A Tuohy needle was introduced through the skin and advanced to this interlaminar space and into the epidural space under fluoroscopic guidance and verified with loss-of-resistance technique to air.  After confirming the position of the needle with the fluoroscope with all the views, and after aspiration was confirmed negative for  blood and CSF, 1.5 mL of Omnipaque was injected.  After seeing appropriate epidurogram with lateral and PA views, a total of 4 cc solution was injected, consisting of 3cc of local anesthetic as above, with normal saline and injectable steroid as above.     ESTIMATED BLOOD LOSS:  <5 mL  SPECIMENS:  None    COMPLICATIONS:     No complications were noted., There was no indication of vascular uptake on live injection of contrast dye., There was no indication of intrathecal uptake on live injection of contrast dye., and There was not any evidence of dural puncture.      TOLERANCE & DISCHARGE CONDITION:    The patient tolerated the procedure well.  The patient was transported to the recovery area without difficulties.  The patient was discharged to home under the care of family in stable and satisfactory condition.    PLAN OF CARE:  The patient was given our standard instruction sheet.  The patient will Return to clinic PRN  The patient will resume all medications as per the medication reconciliation sheet.

## 2024-07-15 NOTE — PLAN OF CARE
Goal Outcome Evaluation:Patient pleasant and cooperative. Had Hydrocodone during night which helped with pain. Sleeping between care. Spine surgeon was consulted and said they will see her this morning.

## 2024-07-15 NOTE — CASE MANAGEMENT/SOCIAL WORK
Discharge Planning Assessment  [facility]     Patient Name: Teena Telles  MRN: [id number]  Today's Date: 7/15/2024    Admit Date: 7/14/2024    Plan: DC PLAN: Routine home     Discharge Needs Assessment       Row Name 07/15/24 1342       Living Environment    Name(s) of People in Home Lives with daughter    Current Living Arrangements home    Potentially Unsafe Housing Conditions none    In the past 12 months has the electric, gas, oil, or water company threatened to shut off services in your home? No    Primary Care Provided by self    Provides Primary Care For no one    Family Caregiver if Needed spouse    Quality of Family Relationships helpful;involved;supportive    Able to Return to Prior Arrangements yes       Resource/Environmental Concerns    Resource/Environmental Concerns none    Transportation Concerns none       Transportation Needs    In the past 12 months, has lack of transportation kept you from medical appointments or from getting medications? no    In the past 12 months, has lack of transportation kept you from meetings, work, or from getting things needed for daily living? No       Food Insecurity    Within the past 12 months, you worried that your food would run out before you got the money to buy more. Never true    Within the past 12 months, the food you bought just didn't last and you didn't have money to get more. Never true       Transition Planning    Patient/Family Anticipates Transition to home with family    Patient/Family Anticipated Services at Transition none    Transportation Anticipated car, drives self;family or friend will provide       Discharge Needs Assessment    Readmission Within the Last 30 Days no previous admission in last 30 days    Equipment Currently Used at Home none    Anticipated Changes Related to Illness none    Equipment Needed After Discharge none                   Discharge Plan       Row Name 07/15/24 1342       Plan    Plan DC PLAN: Routine home    Patient/Family  in Agreement with Plan yes    Plan Comments Met with patient at bedside, from routine home with daughter. Independent with ADL's no DME. PCP is [facility], pharmacy is [facility]. Able to afford medications and denies any issues with food or utilities. Denies any transportation issues, still drives and works. Denies any HHC or SNF needs. Denies any concerns about retun home. To follow up with Pain Management, may DC today.  Listed as uninsured, patient states she is over income                  Continued Care and Services - Admitted Since 7/14/2024    No active coordination exists for this encounter.       Expected Discharge Date and Time       Expected Discharge Date Expected Discharge Time    Jul 15, 2024            Demographic Summary       Row Name 07/15/24 1342       General Information    Admission Type inpatient    Arrived From emergency department    Required Notices Provided Important Message from Medicare    Referral Source admission list    Reason for Consult discharge planning    Preferred Language English      Row Name 07/15/24 1145       General Information    Reason for Consult insurance concerns    General Information Comments WILBERT reviewed chart and noted pt is listed without health coverage. Per MA, pt is over-income for Medicaid. WILBERT met with pt at bedside in room 203 to discuss further. Pt is employed FT with [employer] and reports employer-sponsored health coverage is too expensive. WILBERT placed info for applying for HIP on AVS. Pt agreeable to establishing care with PCP via [employer] - WILBERT obtained appt and placed info on AVS. Updated PCP in Epic. Pt denies being on routine meds but declined M2B, requesting they be sent to [facility] -pharmacy updated. Pt drives and reports she will have transportation at d/c. Pt denies further needs at this time.                   Functional Status       Row Name 07/15/24 1342       Functional Status    Usual Activity Tolerance excellent    Current Activity Tolerance  excellent       Physical Activity    On average, how many days per week do you engage in moderate to strenuous exercise (like a brisk walk)? 0 days    On average, how many minutes do you engage in exercise at this level? 0 min    Number of minutes of exercise per week 0       Assessment of Health Literacy    How often do you have someone help you read hospital materials? Sometimes    How often do you have problems learning about your medical condition because of difficulty understanding written information? Sometimes    How often do you have a problem understanding what is told to you about your medical condition? Sometimes    How confident are you filling out medical forms by yourself? Somewhat    Health Literacy Moderate       Functional Status, IADL    Medications independent    Meal Preparation independent    Housekeeping independent    Laundry independent    Shopping independent       Mental Status    General Appearance WDL WDL       Mental Status Summary    Recent Changes in Mental Status/Cognitive Functioning no changes                    Alexandrea Sullivan, RN

## 2024-07-15 NOTE — DISCHARGE INSTRUCTIONS

## 2024-07-15 NOTE — PLAN OF CARE
Goal Outcome Evaluation:  Plan of Care Reviewed With: patient           Outcome Evaluation: 35 yo female adm 7/14/24 for acute, severe LBP and RLE radiculopathy. Pt was unable to amb at time of admission. MRI of lumbar spine (+) for disc herniation abutting several descending cauda equina nn roots. C spine MRI is (-) for any significant findings.Neurosx has seen pt and is planning epidural injections. No PMH. 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 Today, pt is having 7/10 pain in her RLE only. Denies LBP. Able to come to sitting independently. Pt comes to stand and ambulates 100 ft w/ supervision only. She has a markedly antalgic gait and is protective of her RLE due to pain. Has mild weakness in R hip. Recommend home w/ OP PT at d/c. Will follow 3xwk.

## 2024-07-15 NOTE — THERAPY EVALUATION
Patient Name: Teena Telles  : 1990    MRN: [id number]                              Today's Date: 7/15/2024       Admit Date: 2024    Visit Dx:     ICD-10-CM ICD-9-CM   1. Acute right-sided low back pain with right-sided sciatica  M54.41 724.2     724.3   2. Leukocytosis, unspecified type  D72.829 288.60     Patient Active Problem List   Diagnosis    Back pain    Lumbar radiculopathy, chronic    Sciatic pain, right     History reviewed. No pertinent past medical history.  History reviewed. No pertinent surgical history.   General Information       Row Name 07/15/24 1055          Physical Therapy Time and Intention    Document Type evaluation  -CM     Mode of Treatment physical therapy  -CM       Row Name 07/15/24 1055          General Information    Patient Profile Reviewed yes  -CM     Prior Level of Function independent:;gait;ADL's;driving;work  works as medical assistant  -CM     Existing Precautions/Restrictions spinal  -CM       Row Name 07/15/24 1055          Living Environment    People in Home child(konstantin), dependent  15 yo dtr  -CM       Row Name 07/15/24 1055          Home Main Entrance    Number of Stairs, Main Entrance four  -CM     Stair Railings, Main Entrance railings safe and in good condition  -CM       Row Name 07/15/24 1055          Stairs Within Home, Primary    Number of Stairs, Within Home, Primary none  -CM       Row Name 07/15/24 1055          Cognition    Orientation Status (Cognition) oriented x 4  -CM       Row Name 07/15/24 1055          Safety Issues, Functional Mobility    Impairments Affecting Function (Mobility) strength;pain  -CM               User Key  (r) = Recorded By, (t) = Taken By, (c) = Cosigned By      Initials Name Provider Type    CM Judie Hernandez, PT Physical Therapist                   Mobility       Row Name 07/15/24 1056          Bed Mobility    Bed Mobility bed mobility (all) activities  -CM     All Activities, [place] (Bed Mobility) independent   -CM       Row Name 07/15/24 1056          Sit-Stand Transfer    Sit-Stand [place] (Transfers) supervision  -       Row Name 07/15/24 1056          Gait/Stairs (Locomotion)    [place] Level (Gait) supervision  -     Patient was able to Ambulate yes  -CM     Distance in Feet (Gait) 100  -CM     Deviations/Abnormal Patterns (Gait) antalgic  toe touch prior to heel strike on RLE; pt very protective of RLE; decreased stance on RLE 2* pain  -CM               User Key  (r) = Recorded By, (t) = Taken By, (c) = Cosigned By      Initials Name Provider Type    CM Judie Hernandez, PT Physical Therapist                   Obj/Interventions       Row Name 07/15/24 1057          Range of Motion Comprehensive    General Range of Motion no range of motion deficits identified  -CM     Comment, General Range of Motion full ROM for cervical spine (flexion, extension, rotation, lateral flexion) w/o onset of symptoms  -CM       Row Name 07/15/24 1057          Strength Comprehensive (MMT)    General Manual Muscle Testing (MMT) Assessment lower extremity strength deficits identified  -CM     Comment, General Manual Muscle Testing (MMT) Assessment R hip 4-/5; other RLE mm groups limited to 4/5 due to pain  -       Row Name 07/15/24 1057          Balance    Balance Assessment sitting static balance;sitting dynamic balance;standing static balance;standing dynamic balance  -CM     Static Sitting Balance independent  -CM     Dynamic Sitting Balance independent  -CM     Position, Sitting Balance unsupported;sitting edge of bed  -CM     Static Standing Balance independent  -CM     Dynamic Standing Balance independent  -CM     Position/Device Used, Standing Balance unsupported  -CM       Row Name 07/15/24 1057          Sensory Assessment (Somatosensory)    Sensory Assessment (Somatosensory) sensation intact;other (see comments)  reports she was having numbness in RUE but this is now resolved.  -CM               User Key  (r) =  Recorded By, (t) = Taken By, (c) = Cosigned By      Initials Name Provider Type    CM Judie Hernandez, PT Physical Therapist                   Goals/Plan       Row Name 07/15/24 1105          Transfer Goal 1 (PT)    Activity/Assistive Device (Transfer Goal 1, PT) transfers, all  -CM     [place] Level/Cues Needed (Transfer Goal 1, PT) independent  -CM     Time Frame (Transfer Goal 1, PT) 2 weeks  -CM       Row Name 07/15/24 1105          Gait Training Goal 1 (PT)    Activity/Assistive Device (Gait Training Goal 1, PT) gait (walking locomotion)  -CM     [place] Level (Gait Training Goal 1, PT) independent  -CM     Distance (Gait Training Goal 1, PT) 200 ft w/ equal stance time and normal gait  -CM     Time Frame (Gait Training Goal 1, PT) 2 weeks  -CM       Row Name 07/15/24 1105          Therapy Assessment/Plan (PT)    Planned Therapy Interventions (PT) balance training;gait training;home exercise program;patient/family education;postural re-education;ROM (range of motion);strengthening;transfer training  -CM               User Key  (r) = Recorded By, (t) = Taken By, (c) = Cosigned By      Initials Name Provider Type    Judie Marx, PT Physical Therapist                   Clinical Impression       Row Name 07/15/24 1052          Pain    Pretreatment Pain Rating 7/10  -CM     Posttreatment Pain Rating 7/10  -CM     Pain Location - Side/Orientation Right  -CM     Pain Location lower  -CM     Pain Location - extremity  -CM     Pre/Posttreatment Pain Comment reports pain is only in RLE at this time; denies LBP  -CM     Pain Intervention(s) Repositioned;Ambulation/increased activity;Emotional support  -       Row Name 07/15/24 105          Plan of Care Review    Plan of Care Reviewed With patient  -CM     Outcome Evaluation 35 yo female adm 7/14/24 for acute, severe LBP and RLE radiculopathy. Pt was unable to amb at time of admission. MRI of lumbar spine (+) for disc herniation abutting several  descending cauda equina nn roots. C spine MRI is (-) for any significant findings.Neurosx has seen pt and is planning epidural injections. No PMH. 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 Today, pt is having 7/10 pain in her RLE only. Denies LBP. Able to come to sitting independently. Pt comes to stand and ambulates 100 ft w/ supervision only. She has a markedly antalgic gait and is protective of her RLE due to pain. Has mild weakness in R hip. Recommend home w/ OP PT at d/c. Will follow 3xwk.  -CM       Row Name 07/15/24 1105          Therapy Assessment/Plan (PT)    Rehab Potential (PT) good, to achieve stated therapy goals  -     Criteria for Skilled Interventions Met (PT) yes;meets criteria;skilled treatment is necessary  -     Therapy Frequency (PT) 3 times/wk  -CM     Predicted Duration of Therapy Intervention (PT) until d/c or goals met  -       Row Name 07/15/24 1105          Vital Signs    O2 Delivery Pre Treatment room air  -CM     O2 Delivery Intra Treatment room air  -CM     O2 Delivery Post Treatment room air  -CM       Row Name 07/15/24 1105          Positioning and Restraints    Pre-Treatment Position in bed  -CM     Post Treatment Position bed  -CM     In Bed notified nsg;fowlers;call light within reach;encouraged to call for assist  -               User Key  (r) = Recorded By, (t) = Taken By, (c) = Cosigned By      Initials Name Provider Type    Judie Marx, PT Physical Therapist                   Outcome Measures       Row Name 07/15/24 1106 07/15/24 0845       How much help from another person do you currently need...    Turning from your back to your side while in flat bed without using bedrails? 4  -CM 4  -EV    Moving from lying on back to sitting on the side of a flat bed without bedrails? 4  -CM 4  -EV    Moving to and from a bed to a chair (including a wheelchair)? 4  -CM 4  -EV    Standing up from a chair  using your arms (e.g., wheelchair, bedside chair)? 4  -CM 4  -EV    Climbing 3-5 steps with a railing? 3  -CM 4  -EV    To walk in hospital room? 4  -CM 4  -EV    AM-PAC 6 Clicks Score (PT) 23  -CM 24  -EV    Highest Level of Mobility Goal 7 --> Walk 25 feet or more  -CM 8 --> Walked 250 feet or more  -EV              User Key  (r) = Recorded By, (t) = Taken By, (c) = Cosigned By      Initials Name Provider Type    CM Judie Hernandez, PT Physical Therapist    Gilda Farris, RN Registered Nurse                                 Physical Therapy Education       Title: PT OT SLP Therapies (Done)       Topic: Physical Therapy (Done)       Point: Mobility training (Done)       Learning Progress Summary             Patient Acceptance, E,TB,D, VU,DU by  at 7/15/2024 1106                                         User Key       Initials Effective Dates Name Provider Type Discipline     06/16/21 -  Judie Hernandez, PT Physical Therapist PT                  PT Recommendation and Plan  Planned Therapy Interventions (PT): balance training, gait training, home exercise program, patient/family education, postural re-education, ROM (range of motion), strengthening, transfer training  Plan of Care Reviewed With: patient  Outcome Evaluation: 35 yo female adm 7/14/24 for acute, severe LBP and RLE radiculopathy. Pt was unable to amb at time of admission. MRI of lumbar spine (+) for disc herniation abutting several descending cauda equina nn roots. C spine MRI is (-) for any significant findings.Neurosx has seen pt and is planning epidural injections. No PMH. 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 Today, pt is having 7/10 pain in her RLE only. Denies LBP. Able to come to sitting independently. Pt comes to stand and ambulates 100 ft w/ supervision only. She has a markedly antalgic gait and is protective of her RLE due to pain. Has mild weakness in R hip.  Recommend home w/ OP PT at d/c. Will follow 3xwk.     Time Calculation:   PT Evaluation Complexity  History, PT Evaluation Complexity: no personal factors and/or comorbidities  Examination of Body Systems (PT Eval Complexity): total of 4 or more elements  Clinical Presentation (PT Evaluation Complexity): evolving  Clinical Decision Making (PT Evaluation Complexity): low complexity  Overall Complexity (PT Evaluation Complexity): low complexity     PT Charges       Row Name 07/15/24 1107             Time Calculation    Start Time 0922  -CM      Stop Time 0933  -CM      Time Calculation (min) 11 min  -CM      PT Received On 07/15/24  -CM      PT - Next Appointment 07/17/24  -CM      PT Goal Re-Cert Due Date 07/29/24  -CM         Time Calculation- PT    Total Timed Code Minutes- PT 0 minute(s)  -CM                User Key  (r) = Recorded By, (t) = Taken By, (c) = Cosigned By      Initials Name Provider Type    CM Judie Hernandez, PT Physical Therapist                  Therapy Charges for Today       Code Description Service Date Service Provider Modifiers Qty    [id number]  PT EVAL LOW COMPLEXITY 3 7/15/2024 Judie Hernandez, PT GP 1            PT G-Codes  AM-PAC 6 Clicks Score (PT): 23  PT Discharge Summary  Anticipated Discharge Disposition (PT): home with outpatient therapy services    Judie Hernandez, PT  7/15/2024

## 2024-07-15 NOTE — DISCHARGE SUMMARY
[facility] Medicine Services  Discharge Summary    Date of Service: 7-  Patient Name: Teena Telles  : 1990  MRN: [id number]    Date of Admission: 2024  Discharge Diagnosis: Lumbar radiculopathy  Date of Discharge: 7-  Primary Care Physician: Alexandrea Ortiz APRN      Presenting Problem:   Back pain [M54.9]  Acute right-sided low back pain with right-sided sciatica [M54.41]  Leukocytosis, unspecified type [D72.829]    Active and Resolved Hospital Problems:  Active Hospital Problems    Diagnosis POA    **Back pain [M54.9] Yes    Lumbar radiculopathy, chronic [M54.16] Unknown    Sciatic pain, right [M54.31] Unknown      Resolved Hospital Problems   No resolved problems to display.         Hospital Course     HPI:  Per the H&P     Hospital Course:  Patient was originally admitted after arriving to the ED with complaints of right-sided back pain with right lower extremity paresthesia as well as neck pain with right upper extremity paresthesia.  Patient has a history of sciatica however it started to worsen to the point where she could not walk.  MRI spine was ordered which showed disc herniation at L5-S1 resulting several cauda equina nerve roots.  Neurosurgery was consulted by admitting provider and steroids were given in the ED.  Per neurosurgery, patient is neurologically intact.  Patient desires to get home soon as possible.  Neurosurgery gave the option to do an epidural injection and then follow-up outpatient.  Pain management was consulted and patient is to have epidural injection this afternoon.  Patient is to follow-up in the office for neurosurgery as soon as possible after discharge to come up with treatment plan.  Patient is medically stable for discharge at this time with close outpatient monitoring.    Primary nurse has been informed discharge is to be held until after epidural injection, primary nurses to find out from provider performing epidural injection on  timeline for discharge status post epidural.  Noted leukocytosis however patient received steroids in the ED and will be placed on a Medrol Dosepak at discharge.        DISCHARGE Follow Up Recommendations for labs and diagnostics: PCP in 1 week  Neurosurgery as scheduled, ASAP      Reasons For Change In Medications and Indications for New Medications:  Norco and Medrol Dosepak for lumbar radiculopathy    Day of Discharge     Vital Signs:  Temp:  [98 °F (36.7 °C)-98.6 °F (37 °C)] 98.6 °F (37 °C)  Heart Rate:  [56-99] 86  Resp:  [10-22] 21  BP: (104-177)/() 113/63    Physical Exam:  Physical Exam  Constitutional:       Appearance: She is obese.   HENT:      Head: Normocephalic and atraumatic.      Nose: Nose normal.      Mouth/Throat:      Mouth: Mucous membranes are moist.      Pharynx: Oropharynx is clear.   Eyes:      Extraocular Movements: Extraocular movements intact.      Conjunctiva/sclera: Conjunctivae normal.      Pupils: Pupils are equal, round, and reactive to light.   Cardiovascular:      Rate and Rhythm: Normal rate and regular rhythm.      Pulses: Normal pulses.   Pulmonary:      Effort: Pulmonary effort is normal.   Abdominal:      General: Abdomen is flat.      Palpations: Abdomen is soft.   Musculoskeletal:         General: Normal range of motion.      Cervical back: Normal range of motion and neck supple.   Skin:     General: Skin is warm and dry.   Neurological:      General: No focal deficit present.      Mental Status: She is alert and oriented to person, place, and time. Mental status is at baseline.   Psychiatric:         Mood and Affect: Mood normal.         Behavior: Behavior normal.         Thought Content: Thought content normal.         Judgment: Judgment normal.            Pertinent  and/or Most Recent Results     LAB RESULTS:      Lab 07/15/24  0455 07/14/24  1529 07/14/24  1425   WBC 20.23*  --  21.94*   HEMOGLOBIN 12.2  --  14.6   HEMATOCRIT 37.6  --  45.4   PLATELETS 277  --  305    NEUTROS ABS 14.60*  --  18.24*   IMMATURE GRANS (ABS) 0.17*  --  0.22*   LYMPHS ABS 4.30*  --  2.45   MONOS ABS 1.07*  --  0.91*   EOS ABS 0.06  --  0.08   MCV 92.8  --  92.8   SED RATE  --   --  8   CRP  --   --  <0.30   PROCALCITONIN  --   --  0.03   LACTATE  --  1.4  --          Lab 07/15/24  0413 07/14/24  1425   SODIUM 138 138   POTASSIUM 4.4 3.8   CHLORIDE 105 101   CO2 24.9 23.9   ANION GAP 8.1 13.1   BUN 20 15   CREATININE 0.85 0.98   EGFR 91.2 76.9   GLUCOSE 136* 212*   CALCIUM 8.4* 8.9   MAGNESIUM 2.4  --    PHOSPHORUS 4.3  --          Lab 07/14/24  1425   TOTAL PROTEIN 7.0   ALBUMIN 4.2   GLOBULIN 2.8   ALT (SGPT) 14   AST (SGOT) 13   BILIRUBIN 0.4   ALK PHOS 55                     Brief Urine Lab Results  (Last result in the past 365 days)        Color   Clarity   Blood   Leuk Est   Nitrite   Protein   CREAT   Urine HCG        07/14/24 1425 Yellow   Cloudy  Comment: Result checked     Negative   Trace   Negative   Trace           07/14/24 1425               Negative             Microbiology Results (last 10 days)       ** No results found for the last 240 hours. **            MRI Lumbar Spine With & Without Contrast    Result Date: 7/14/2024  Impression: Disc herniation at L5-S1 resulting several cauda equina nerve roots. No abnormal enhancement or evidence of infection. Electronically Signed: Nitesh Soria MD  7/14/2024 5:41 PM EDT  Workstation ID: [id number]    MRI Thoracic Spine With & Without Contrast    Result Date: 7/14/2024  Impression: No evidence of discitis or osteomyelitis. Electronically Signed: Nitesh Soria MD  7/14/2024 5:33 PM EDT  Workstation ID: [id number]    MRI Cervical Spine With & Without Contrast    Result Date: 7/14/2024  Impression: No evidence of discitis or osteomyelitis. Electronically Signed: Nitesh Soria MD  7/14/2024 5:31 PM EDT  Workstation ID: [id number]    XR Chest 1 View    Result Date: 7/14/2024  Impression: Impression: No acute process. Electronically Signed: Gino Boswell,  MD  7/14/2024 3:35 PM EDT  Workstation ID: [id number]                 Labs Pending at Discharge:  Pending Labs       Order Current Status    Blood Culture - Blood, Arm, Left In process    Blood Culture - Blood, Arm, Right In process            Procedures Performed           Consults:   Consults       Date and Time Order Name Status Description    7/14/2024  8:34 PM Inpatient Spine Surgery Consult Completed     7/14/2024  3:12 PM Hospitalist (on-call MD unless specified)                Discharge Details        Discharge Medications        New Medications        Instructions Start Date   HYDROcodone-acetaminophen 5-325 MG per tablet  Commonly known as: NORCO   1 tablet, Oral, Every 6 Hours PRN      methylPREDNISolone 4 MG dose pack  Commonly known as: MEDROL   Take as directed on package instructions.             Continue These Medications        Instructions Start Date   acetaminophen 325 MG tablet  Commonly known as: TYLENOL   650 mg, Oral, Every 6 Hours PRN      ibuprofen 200 MG tablet  Commonly known as: ADVIL,MOTRIN   200 mg, Oral, Every 6 Hours PRN               Allergies   Allergen Reactions    Metformin Diarrhea         Discharge Disposition:   Home or Self Care    Diet:  Hospital:  Diet Order   Procedures    Diet: Regular/House; Fluid Consistency: Thin (IDDSI 0)         Discharge Activity:         CODE STATUS:  Code Status and Medical Interventions:   Ordered at: 07/14/24 1527     Code Status (Patient has no pulse and is not breathing):    CPR (Attempt to Resuscitate)     Medical Interventions (Patient has pulse or is breathing):    Full Support         Future Appointments   Date Time Provider Department Center   7/15/2024  1:45 PM [facility] None       Additional Instructions for the Follow-ups that You Need to Schedule       Discharge Follow-up with PCP   As directed       Currently Documented PCP:    Alexandrea Ortiz APRN    PCP Phone Number:    [phone]     Follow Up  Details: 1 week        Discharge Follow-up with Specified Provider: Dr. Mcleod/ Marcelle Wheeler PA-C Neurosurgery   As directed      To: Dr. Mcleod/ Marcelle Wheeler PA-C Neurosurgery   Follow Up Details: ASAP                Time spent on Discharge including face to face service:  >30 minutes    Signature: Electronically signed by WILLOW Sherman, 07/15/24, 11:56 EDT.  [facility]ist Team

## 2024-07-15 NOTE — CASE MANAGEMENT/SOCIAL WORK
Social Work Assessment  [facility]     Patient Name: Teena Telles  MRN: [id number]  Today's Date: 7/15/2024    Admit Date: 7/14/2024     Demographic Summary       Row Name 07/15/24 1148       General Information    Reason for Consult insurance concerns      General Information Comments WILBERT reviewed chart and noted pt is listed without health coverage. Per MA, pt is over-income for Medicaid. WILBERT met with pt at bedside in room 203 to discuss further. Pt is employed FT with [employer] and reports employer-sponsored health coverage is too expensive. SW placed info for applying for HIP on AVS. Pt agreeable to establishing care with PCP via [employer] - WILBERT obtained appt and placed info on AVS. Updated PCP in Epic. Pt denies being on routine meds but declined M2B, requesting they be sent to [facility] -pharmacy updated. Pt drives and reports she will have transportation at d/c. Pt denies further needs at this time.             Alexandrea Mejia MSW, LSW  Medical Social Worker  Ph [phone]  Fax [phone]  [email or web address]

## 2024-07-15 NOTE — CONSULTS
[facility] NEUROSURGERY CONSULT NOTE    Patient name: Teena Telles  Referring Provider:     Tripp Benavides MD     Reason for Consultation:     disc herniation, cauda equina       Patient Care Team:  Yaneth Gonzales APRN as PCP - General (Nurse Practitioner)  Provider, No Known as PCP - Family Medicine    Chief complaint: Right leg pain    Subjective .     History of present illness:    Patient is a 34 y.o. female who presents to [facility] with right leg pain.  Patient states that she has had this ongoing sciatica for years that has always been relieved with rest, ice, ibuprofen and exercises she found on the Internet.  However, for the past 2 weeks the pain has increased and her usual remedies have not been helping her.  Patient states that not only did her pain increase, but her right leg has also started to become numb.  He admits that her right arm has also been a little numb this morning.  Patient states that no matter what position she is in her right leg causes her pain.  She denies any symptoms of neurogenic claudication.  She states that the last couple days she has had to  her leg in order to walk due to the pain.  Patient does not necessarily have any back pain, but the pain is primarily from her right buttock all the way down her leg into her foot.  For coming in she stated her pain was 10/10 and it is currently a 7/10.  Patient denies ever having official physical therapy, going to pain management or any previous back surgery.  Patient denies any bowel or bladder incontinence or saddle anesthesia at this time.    Review of Systems  Review of Systems   Constitutional:  Positive for activity change.   Musculoskeletal:  Positive for back pain and gait problem.   Neurological:  Positive for numbness.       History  PAST MEDICAL HISTORY  History reviewed. No pertinent past medical history.    PAST SURGICAL HISTORY  History reviewed. No pertinent surgical history.    FAMILY HISTORY  History reviewed. No  pertinent family history.    SOCIAL HISTORY  Social History     Tobacco Use    Smoking status: Never    Smokeless tobacco: Never    Tobacco comments:     N/A   Vaping Use    Vaping status: Never Used   Substance Use Topics    Alcohol use: Not Currently    Drug use: Never     Allergies:  Metformin    MEDICATIONS:  Medications Prior to Admission   Medication Sig Dispense Refill Last Dose    acetaminophen (TYLENOL) 325 MG tablet Take 2 tablets by mouth Every 6 (Six) Hours As Needed for Mild Pain.       ibuprofen (ADVIL,MOTRIN) 200 MG tablet Take 1 tablet by mouth Every 6 (Six) Hours As Needed for Mild Pain.            Current Facility-Administered Medications:     acetaminophen (TYLENOL) tablet 650 mg, 650 mg, Oral, Q4H PRN, 650 mg at 07/14/24 1819 **OR** acetaminophen (TYLENOL) 160 MG/5ML oral solution 650 mg, 650 mg, Oral, Q4H PRN **OR** acetaminophen (TYLENOL) suppository 650 mg, 650 mg, Rectal, Q4H PRNKennedy Yadana, MD    aluminum-magnesium hydroxide-simethicone (MAALOX MAX) 400-400-40 MG/5ML suspension 15 mL, 15 mL, Oral, Q6H PRNKennedy Yadana, MD    sennosides-docusate (PERICOLACE) 8.6-50 MG per tablet 2 tablet, 2 tablet, Oral, BID PRN **AND** polyethylene glycol (MIRALAX) packet 17 g, 17 g, Oral, Daily PRN **AND** bisacodyl (DULCOLAX) EC tablet 5 mg, 5 mg, Oral, Daily PRN **AND** bisacodyl (DULCOLAX) suppository 10 mg, 10 mg, Rectal, Daily PRN, Tripp Benavides MD    Calcium Replacement - Follow Nurse / BPA Driven Protocol, , Does not apply, Kennedy RIZO Yadana, MD    hydrALAZINE (APRESOLINE) injection 10 mg, 10 mg, Intravenous, Q6H PRNKennedy Yadana, MD    HYDROcodone-acetaminophen (NORCO) 5-325 MG per tablet 1 tablet, 1 tablet, Oral, Q6H PRNKennedy Yadana, MD, 1 tablet at 07/14/24 2310    HYDROmorphone (DILAUDID) injection 1 mg, 1 mg, Intravenous, Q2H PRN, Tripp Benavides MD    lidocaine (XYLOCAINE) 1 % injection 1 mL, 1 mL, Subcutaneous, Once, Tripp Benavides MD    Magnesium Standard Dose Replacement - Follow Nurse / BPA Driven  Protocol, , Does not apply, Kennedy RIZO Yadana, MD    melatonin tablet 5 mg, 5 mg, Oral, Nightly PRN, Tripp Benavides MD    nitroglycerin (NITROSTAT) SL tablet 0.4 mg, 0.4 mg, Sublingual, Q5 Min PRN, Tripp Benavides MD    ondansetron ODT (ZOFRAN-ODT) disintegrating tablet 4 mg, 4 mg, Oral, Q6H PRN **OR** ondansetron (ZOFRAN) injection 4 mg, 4 mg, Intravenous, Q6H PRN, Tripp Benavides MD    pantoprazole (PROTONIX) EC tablet 40 mg, 40 mg, Oral, Q AM, Tripp Beanvides MD, 40 mg at 07/15/24 0524    Phosphorus Replacement - Follow Nurse / BPA Driven Protocol, , Does not apply, Kennedy RIZO Yadana, MD    Potassium Replacement - Follow Nurse / BPA Driven Protocol, , Does not apply, Kennedy RIZO Yadana, MD    predniSONE (DELTASONE) tablet 80 mg, 80 mg, Oral, Daily, Tripp Benavides MD    [COMPLETED] Insert Peripheral IV, , , Once **AND** sodium chloride 0.9 % flush 10 mL, 10 mL, Intravenous, PRN, Bobby Anderson MD    sodium chloride 0.9 % flush 10 mL, 10 mL, Intravenous, Q12H, Tripp Benavides MD, 10 mL at 07/14/24 2109    sodium chloride 0.9 % flush 10 mL, 10 mL, Intravenous, PRN, Tripp Benavides MD    sodium chloride 0.9 % infusion 40 mL, 40 mL, Intravenous, PRN, Tripp Benavides MD      Objective     Results Review:  LABS:  Results from last 7 days   Lab Units 07/15/24  0455 07/14/24  1425   WBC 10*3/mm3 20.23* 21.94*   HEMOGLOBIN g/dL 12.2 14.6   HEMATOCRIT % 37.6 45.4   PLATELETS 10*3/mm3 277 305     Results from last 7 days   Lab Units 07/15/24  0413 07/14/24  1425   SODIUM mmol/L 138 138   POTASSIUM mmol/L 4.4 3.8   CHLORIDE mmol/L 105 101   CO2 mmol/L 24.9 23.9   BUN mg/dL 20 15   CREATININE mg/dL 0.85 0.98   CALCIUM mg/dL 8.4* 8.9   BILIRUBIN mg/dL  --  0.4   ALK PHOS U/L  --  55   ALT (SGPT) U/L  --  14   AST (SGOT) U/L  --  13   GLUCOSE mg/dL 136* 212*         DIAGNOSTICS:  MRI LUMBAR SPINE W WO CONTRAST     Date of Exam: 7/14/2024 4:21 PM EDT     Indication: Elevated white count fever chills low back pain sciatica white count 21,000 rule out abscess.      Comparison: None available.     Technique:  Routine multiplanar/multisequence sequence images of the lumbar spine were obtained before and after the uneventful administration of 20 cc Prohance.       Findings: Lumbar vertebral body height and alignment are normal. Disc base narrowing at L5-S1. Bone marrow signal characteristics are normal. Heterogenous appearance of the bone marrow likely due to demineralization. No marrow edema. No abnormal   enhancement. The conus medullaris terminates at the L1 vertebral body level. No cord signal abnormalities. Mild left-sided hydroureteronephrosis     T12-L1: No significant spinal canal or foraminal narrowing.     L1-L2: No significant spinal canal or foraminal narrowing.     L2-L3: No significant spinal canal or foraminal narrowing.     L3-L4: No significant spinal canal or foraminal narrowing.     L4-L5: No significant spinal canal or foraminal narrowing.     L5-S1: Disc herniation abutting several descending cauda equina nerve roots. Mild canal stenosis. Moderate to severe bilateral foraminal narrowing.     IMPRESSION:  Disc herniation at L5-S1 resulting several cauda equina nerve roots. No abnormal enhancement or evidence of infection.       Results Review:   I reviewed the patient's new clinical results.  I personally viewed patient's chart and imaging    Vital Signs   Temp:  [98 °F (36.7 °C)-98.6 °F (37 °C)] 98.6 °F (37 °C)  Heart Rate:  [56-99] 86  Resp:  [10-22] 21  BP: (104-177)/() 113/63    Physical Exam:  Physical Exam  Eyes:      Extraocular Movements: Extraocular movements intact.      Conjunctiva/sclera: Conjunctivae normal.      Pupils: Pupils are equal, round, and reactive to light.   Musculoskeletal:         General: Normal range of motion.      Cervical back: Normal range of motion.   Skin:     General: Skin is warm and dry.   Neurological:      General: No focal deficit present.      Mental Status: She is alert and oriented to person, place, and time.    Psychiatric:         Mood and Affect: Mood normal.         Speech: Speech normal.       Neurologic Exam     Mental Status   Oriented to person, place, and time.   Speech: speech is normal   Level of consciousness: alert  Knowledge: good.     Cranial Nerves     CN III, IV, VI   Pupils are equal, round, and reactive to light.    Motor Exam   Muscle bulk: normal  Right arm tone: normal  Left arm tone: normal  Right leg tone: normal  Left leg tone: normal    Strength   Right deltoid: 5/5  Left deltoid: 5/5  Right biceps: 5/5  Left biceps: 5/5  Right triceps: 5/5  Left triceps: 5/5  Right wrist flexion: 5/5  Left wrist flexion: 5/5  Right wrist extension: 5/5  Right iliopsoas: 5/5  Left iliopsoas: 5/5  Right quadriceps: 5/5  Left quadriceps: 5/5  Right hamstrin/5  Left hamstrin/5  Right anterior tibial: 5/5  Left anterior tibial: 5/5  Right posterior tibial: 5/5  Left posterior tibial: 5/5    Sensory Exam   Light touch normal.     Gait, Coordination, and Reflexes Not tested.       Assessment & Plan       Back pain      Problem List Items Addressed This Visit          Musculoskeletal and Injuries    * (Principal) Back pain - Primary     Other Visit Diagnoses       Leukocytosis, unspecified type        Relevant Medications    ketorolac (TORADOL) injection 15 mg (Completed)    methylPREDNISolone acetate (DEPO-medrol) injection 80 mg (Completed)    ibuprofen (ADVIL,MOTRIN) 200 MG tablet    predniSONE (DELTASONE) tablet 80 mg             COMORBID CONDITIONS: Obesity     Patient is a 34-year-old female who presents to [facility] after 2 weeks of severe right leg pain and numbness.  Patient states that she has experienced this before, but not to this level.  In addition she has been able to remedy the symptoms in the past, however, she has been unable to control her symptoms at this time.  Patient had a MRI of her lumbar spine showing disc herniation at L5-S1 resulting in several cauda equina nerve roots.  Patient  "denies any red flags at this time and her physical exam showed full strength at this time.  Patient had no clonus or Ambrocio's on exam.  Patient is negative for dropfoot in her bilateral feet.  Patient is neurologically intact.      Patient mentioned that she would like to get home as quickly as possible due to having to work and having a teenager at home.  I gave her the option that we can get an epidural injection while she is in the hospital to help relieve some of her pain or we can wait until she sees us in clinic.  Patient states she would like to get the epidural injection while in the hospital to help relieve some of her pain to that when she goes home she is able to do her daily tasks.  I consulted pain management, thank you for your help.  I explained to the patient that she does not require urgent/emergent surgery at this time.  However, she does have a large disc herniation and I would like her to follow-up in office to have a treatment plan moving towards surgery sooner rather than later.  She agrees to this plan.  Please reach out any questions or concerns.        PLAN: Lumbar radiculopathy  -Epidural steroid injection  -Follow-up in clinic once discharged    I discussed the patient's findings and my recommendations with patient, nursing staff, and Dr. Mcleod.       Marcelle Wheeler PA-C  07/15/24  09:41 EDT    \"Dictated utilizing Dragon dictation\".      "

## 2024-07-19 LAB
BACTERIA SPEC AEROBE CULT: NORMAL
BACTERIA SPEC AEROBE CULT: NORMAL

## 2024-07-22 ENCOUNTER — TELEPHONE (OUTPATIENT)
Dept: NEUROSURGERY | Facility: CLINIC | Age: 34
End: 2024-07-22

## 2024-07-22 NOTE — TELEPHONE ENCOUNTER
Caller: Teena Telles    Relationship to patient: Self    Best call back number: [phone]    Patient is needing: PATIENT CALLED, STATES SHE DOES NOT HAVE HER INSURANCE CARDS YET AND IS NEEDING TO RESCHEDULE HER APPT FOR TODAY @2:15. PLEASE CALL PATIENT TO RESCHEDULE, SENDING AS HIGH PRIORITY.    THANK YOU

## 2024-08-11 ENCOUNTER — HOSPITAL ENCOUNTER (EMERGENCY)
Facility: HOSPITAL | Age: 34
Discharge: HOME | End: 2024-08-11
Attending: EMERGENCY MEDICINE | Admitting: EMERGENCY MEDICINE
Payer: MEDICAID

## 2024-08-11 ENCOUNTER — APPOINTMENT (OUTPATIENT)
Dept: GENERAL RADIOLOGY | Facility: HOSPITAL | Age: 34
End: 2024-08-11
Payer: MEDICAID

## 2024-08-11 ENCOUNTER — APPOINTMENT (OUTPATIENT)
Dept: CARDIOLOGY | Facility: HOSPITAL | Age: 34
End: 2024-08-11
Payer: MEDICAID

## 2024-08-11 VITALS
RESPIRATION RATE: 18 BRPM | BODY MASS INDEX: 43.4 KG/M2 | WEIGHT: 293 LBS | SYSTOLIC BLOOD PRESSURE: 124 MMHG | OXYGEN SATURATION: 96 % | DIASTOLIC BLOOD PRESSURE: 74 MMHG | HEIGHT: 69 IN | HEART RATE: 70 BPM | TEMPERATURE: 98.4 F

## 2024-08-11 DIAGNOSIS — M25.561 ACUTE PAIN OF RIGHT KNEE: Primary | ICD-10-CM

## 2024-08-11 DIAGNOSIS — M79.604 PAIN OF RIGHT LOWER EXTREMITY: ICD-10-CM

## 2024-08-11 LAB
BH CV LOWER VASCULAR LEFT COMMON FEMORAL AUGMENT: NORMAL
BH CV LOWER VASCULAR LEFT COMMON FEMORAL COMPETENT: NORMAL
BH CV LOWER VASCULAR LEFT COMMON FEMORAL COMPRESS: NORMAL
BH CV LOWER VASCULAR LEFT COMMON FEMORAL PHASIC: NORMAL
BH CV LOWER VASCULAR LEFT COMMON FEMORAL SPONT: NORMAL
BH CV LOWER VASCULAR RIGHT COMMON FEMORAL AUGMENT: NORMAL
BH CV LOWER VASCULAR RIGHT COMMON FEMORAL COMPETENT: NORMAL
BH CV LOWER VASCULAR RIGHT COMMON FEMORAL COMPRESS: NORMAL
BH CV LOWER VASCULAR RIGHT COMMON FEMORAL PHASIC: NORMAL
BH CV LOWER VASCULAR RIGHT COMMON FEMORAL SPONT: NORMAL
BH CV LOWER VASCULAR RIGHT DISTAL FEMORAL COMPRESS: NORMAL
BH CV LOWER VASCULAR RIGHT GASTRONEMIUS COMPRESS: NORMAL
BH CV LOWER VASCULAR RIGHT GREATER SAPH AK COMPRESS: NORMAL
BH CV LOWER VASCULAR RIGHT GREATER SAPH BK COMPRESS: NORMAL
BH CV LOWER VASCULAR RIGHT LESSER SAPH COMPRESS: NORMAL
BH CV LOWER VASCULAR RIGHT MID FEMORAL AUGMENT: NORMAL
BH CV LOWER VASCULAR RIGHT MID FEMORAL COMPETENT: NORMAL
BH CV LOWER VASCULAR RIGHT MID FEMORAL COMPRESS: NORMAL
BH CV LOWER VASCULAR RIGHT MID FEMORAL PHASIC: NORMAL
BH CV LOWER VASCULAR RIGHT MID FEMORAL SPONT: NORMAL
BH CV LOWER VASCULAR RIGHT PERONEAL COMPRESS: NORMAL
BH CV LOWER VASCULAR RIGHT POPLITEAL AUGMENT: NORMAL
BH CV LOWER VASCULAR RIGHT POPLITEAL COMPETENT: NORMAL
BH CV LOWER VASCULAR RIGHT POPLITEAL COMPRESS: NORMAL
BH CV LOWER VASCULAR RIGHT POPLITEAL PHASIC: NORMAL
BH CV LOWER VASCULAR RIGHT POPLITEAL SPONT: NORMAL
BH CV LOWER VASCULAR RIGHT POSTERIOR TIBIAL COMPRESS: NORMAL
BH CV LOWER VASCULAR RIGHT PROXIMAL FEMORAL COMPRESS: NORMAL
BH CV LOWER VASCULAR RIGHT SAPHENOFEMORAL JUNCTION COMPRESS: NORMAL
BH CV VAS PRELIMINARY FINDINGS SCRIPTING: 1

## 2024-08-11 PROCEDURE — 73562 X-RAY EXAM OF KNEE 3: CPT

## 2024-08-11 PROCEDURE — 73590 X-RAY EXAM OF LOWER LEG: CPT

## 2024-08-11 PROCEDURE — 25010000002 HYDROMORPHONE 1 MG/ML SOLUTION: Performed by: EMERGENCY MEDICINE

## 2024-08-11 PROCEDURE — 93971 EXTREMITY STUDY: CPT | Performed by: SURGERY

## 2024-08-11 PROCEDURE — 25010000002 KETOROLAC TROMETHAMINE PER 15 MG: Performed by: EMERGENCY MEDICINE

## 2024-08-11 PROCEDURE — 63710000001 ONDANSETRON ODT 4 MG TABLET DISPERSIBLE: Performed by: EMERGENCY MEDICINE

## 2024-08-11 PROCEDURE — 93971 EXTREMITY STUDY: CPT

## 2024-08-11 PROCEDURE — 99284 EMERGENCY DEPT VISIT MOD MDM: CPT

## 2024-08-11 PROCEDURE — 96372 THER/PROPH/DIAG INJ SC/IM: CPT

## 2024-08-11 RX ORDER — MELOXICAM 15 MG/1
15 TABLET ORAL DAILY
Qty: 15 TABLET | Refills: 0 | Status: SHIPPED | OUTPATIENT
Start: 2024-08-11

## 2024-08-11 RX ORDER — KETOROLAC TROMETHAMINE 30 MG/ML
30 INJECTION, SOLUTION INTRAMUSCULAR; INTRAVENOUS ONCE
Status: COMPLETED | OUTPATIENT
Start: 2024-08-11 | End: 2024-08-11

## 2024-08-11 RX ORDER — METHOCARBAMOL 500 MG/1
500 TABLET, FILM COATED ORAL 4 TIMES DAILY
Qty: 40 TABLET | Refills: 0 | Status: SHIPPED | OUTPATIENT
Start: 2024-08-11

## 2024-08-11 RX ORDER — ONDANSETRON 2 MG/ML
4 INJECTION INTRAMUSCULAR; INTRAVENOUS ONCE
Status: DISCONTINUED | OUTPATIENT
Start: 2024-08-11 | End: 2024-08-11

## 2024-08-11 RX ORDER — ONDANSETRON 4 MG/1
4 TABLET, ORALLY DISINTEGRATING ORAL ONCE
Status: COMPLETED | OUTPATIENT
Start: 2024-08-11 | End: 2024-08-11

## 2024-08-11 RX ADMIN — ONDANSETRON 4 MG: 4 TABLET, ORALLY DISINTEGRATING ORAL at 09:42

## 2024-08-11 RX ADMIN — KETOROLAC TROMETHAMINE 30 MG: 30 INJECTION, SOLUTION INTRAMUSCULAR at 09:42

## 2024-08-11 RX ADMIN — HYDROMORPHONE HYDROCHLORIDE 1 MG: 1 INJECTION, SOLUTION INTRAMUSCULAR; INTRAVENOUS; SUBCUTANEOUS at 09:45

## 2024-08-11 NOTE — ED PROVIDER NOTES
Subjective   History of Present Illness  Chief complaint: Patient is a 34-year-old.  She was here with lumbar back pain.  She had herniated disc L5-S1.  There is some concern for compression around the cauda equina nerves.  She was admitted.  She received an epidural injection.  She was seen by neurosurgery.  She states that she has not had any of those problems since.  She notes that this point in time 3 AM waking up with right knee pain radiating down the leg.  Most of the pain is in the front.  She is not having numbness or weakness.  No loss of bowel or bladder.  She has not followed up with neurosurgery for her back pain but she states this is different.  She has this pain since 3 AM and is not sure about injury as she was sleeping when she noticed it.  She has not had fever.    Context:    Duration:    Timing:    Severity:    Associated Symptoms:        PCP:  LMP:      Review of Systems   HENT: Negative.     Respiratory: Negative.     Cardiovascular: Negative.    Gastrointestinal: Negative.    Musculoskeletal:  Positive for arthralgias.   Skin: Negative.    Neurological:  Negative for weakness and numbness.       No past medical history on file.    Allergies   Allergen Reactions    Metformin Diarrhea       No past surgical history on file.    No family history on file.    Social History     Socioeconomic History    Marital status: Single   Tobacco Use    Smoking status: Never    Smokeless tobacco: Never    Tobacco comments:     N/A   Vaping Use    Vaping status: Never Used   Substance and Sexual Activity    Alcohol use: Not Currently    Drug use: Never    Sexual activity: Defer           Objective   Physical Exam  Vitals and nursing note reviewed.   Constitutional:       Appearance: Normal appearance.   HENT:      Head: Normocephalic and atraumatic.   Cardiovascular:      Rate and Rhythm: Normal rate and regular rhythm.   Musculoskeletal:      Cervical back: Normal range of motion.      Left knee: Bony  tenderness present. No swelling or deformity. Normal range of motion. Tenderness present.        Legs:       Comments: Patient is neurovascular tact distally.  There is tenderness to the anterior portion of the knee.  Tenderness along the lateral medial calf and anterior tibia of the lower leg.  She is neurovascular intact distally.  Good pulses.  Good range of motion of the ankle.  She can bend the knee adequately.  There is no redness.  No swelling.  No signs of septic joint.   Skin:     General: Skin is warm and dry.   Neurological:      Mental Status: She is alert.         Procedures           ED Course                                   Results for orders placed or performed during the hospital encounter of 08/11/24   Duplex Venous Lower Extremity - RIGHT   Result Value Ref Range    Right Common Femoral Spont Y     Right Common Femoral Competent Y     Right Common Femoral Phasic Y     Right Common Femoral Compress C     Right Common Femoral Augment Y     Right Saphenofemoral Junction Compress C     Right Proximal Femoral Compress C     Right Mid Femoral Spont Y     Right Mid Femoral Competent Y     Right Mid Femoral Phasic Y     Right Mid Femoral Compress C     Right Mid Femoral Augment Y     Right Distal Femoral Compress C     Right Popliteal Spont Y     Right Popliteal Competent Y     Right Popliteal Phasic Y     Right Popliteal Compress C     Right Popliteal Augment Y     Right Posterior Tibial Compress C     Right Peroneal Compress C     Right Gastronemius Compress C     Right Greater Saph AK Compress C     Right Greater Saph BK Compress C     Right Lesser Saph Compress C     Left Common Femoral Spont Y     Left Common Femoral Competent Y     Left Common Femoral Phasic Y     Left Common Femoral Compress C     Left Common Femoral Augment Y     BH CV VAS PRELIMINARY FINDINGS SCRIPTING 1.0      XR Knee 3 View Right    Result Date: 8/11/2024  Impression: No acute process identified Electronically Signed: Leroy  MD Gary  8/11/2024 9:40 AM EDT  Workstation ID: [id number]    XR Tibia Fibula 2 View Right    Result Date: 8/11/2024  Impression: No acute process identified Electronically Signed: Leroy Vega MD  8/11/2024 9:39 AM EDT  Workstation ID: [id number]             Medical Decision Making  Patient was seen and evaluated for the above problem    Differential diagnosis includes but is not limited to cauda equina, septic joint, knee sprain, compartment syndrome    Patient has soft compartments.  She has no redness of her knee and can bend the joint fully.  I do not think she has septic joint or compartment syndrome.  She has pain in the anterior portion of her knee.  She has had some back pain for the last month.  She is not having any cauda equina symptoms.  No radicular symptoms down the leg.  She has pain that radiates from the knee down her leg.  She has good strength.  She has normal DTR.  She has no loss of bowel or bladder.  Her pain seems more isolated to her knee.  X-rays revealed no emergent findings.  I did treat the patient's pain here in the emergency department.  I discussed emergent symptoms for return.  Potentially with her back pain over the the month she could have some persisting lower radicular symptoms.  I did discuss she could also be favoring that area and causing some irritation to other joints.  I discussed placing her in the observation unit she has not been able to get insurance together for follow-up with neurosurgery to have a reevaluation.  But she feels comfortable with discharge follow-up outpatient.  She will return for worsening symptoms signs or concerns.    Amount and/or Complexity of Data Reviewed  Radiology: ordered and independent interpretation performed.     Details: X-rays and ultrasound reviewed by myself as above    Risk  Prescription drug management.  Parenteral controlled substances.  Decision regarding hospitalization.        Final diagnoses:   None     Right knee and leg  pain  ED Disposition  ED Disposition       None            No follow-up provider specified.       Medication List      No changes were made to your prescriptions during this visit.            Chris Boyd,   08/11/24 1002

## 2024-08-29 ENCOUNTER — APPOINTMENT (OUTPATIENT)
Dept: CT IMAGING | Facility: HOSPITAL | Age: 34
End: 2024-08-29
Payer: COMMERCIAL

## 2024-08-29 ENCOUNTER — HOSPITAL ENCOUNTER (EMERGENCY)
Facility: HOSPITAL | Age: 34
Discharge: HOME | End: 2024-08-29
Attending: EMERGENCY MEDICINE
Payer: COMMERCIAL

## 2024-08-29 VITALS
HEART RATE: 104 BPM | RESPIRATION RATE: 20 BRPM | OXYGEN SATURATION: 91 % | DIASTOLIC BLOOD PRESSURE: 84 MMHG | SYSTOLIC BLOOD PRESSURE: 133 MMHG | BODY MASS INDEX: 43.4 KG/M2 | WEIGHT: 293 LBS | HEIGHT: 69 IN | TEMPERATURE: 97.4 F

## 2024-08-29 DIAGNOSIS — R20.2 PARESTHESIA: Primary | ICD-10-CM

## 2024-08-29 DIAGNOSIS — N39.0 ACUTE UTI: ICD-10-CM

## 2024-08-29 LAB
ALBUMIN SERPL-MCNC: 4.1 G/DL (ref 3.5–5.2)
ALBUMIN/GLOB SERPL: 1.8 G/DL
ALP SERPL-CCNC: 40 U/L (ref 39–117)
ALT SERPL W P-5'-P-CCNC: 23 U/L (ref 1–33)
ANION GAP SERPL CALCULATED.3IONS-SCNC: 11.3 MMOL/L (ref 5–15)
AST SERPL-CCNC: 15 U/L (ref 1–32)
BACTERIA UR QL AUTO: ABNORMAL /HPF
BASOPHILS # BLD AUTO: 0.01 10*3/MM3 (ref 0–0.2)
BASOPHILS NFR BLD AUTO: 0.1 % (ref 0–1.5)
BILIRUB SERPL-MCNC: 0.3 MG/DL (ref 0–1.2)
BILIRUB UR QL STRIP: NEGATIVE
BUN SERPL-MCNC: 14 MG/DL (ref 6–20)
BUN/CREAT SERPL: 14.3 (ref 7–25)
CALCIUM SPEC-SCNC: 8.9 MG/DL (ref 8.6–10.5)
CHLORIDE SERPL-SCNC: 102 MMOL/L (ref 98–107)
CLARITY UR: ABNORMAL
CO2 SERPL-SCNC: 24.7 MMOL/L (ref 22–29)
COLOR UR: YELLOW
CREAT SERPL-MCNC: 0.98 MG/DL (ref 0.57–1)
DEPRECATED RDW RBC AUTO: 46.9 FL (ref 37–54)
EGFRCR SERPLBLD CKD-EPI 2021: 76.9 ML/MIN/1.73
EOSINOPHIL # BLD AUTO: 0.12 10*3/MM3 (ref 0–0.4)
EOSINOPHIL NFR BLD AUTO: 0.9 % (ref 0.3–6.2)
ERYTHROCYTE [DISTWIDTH] IN BLOOD BY AUTOMATED COUNT: 13.9 % (ref 12.3–15.4)
GLOBULIN UR ELPH-MCNC: 2.3 GM/DL
GLUCOSE SERPL-MCNC: 126 MG/DL (ref 65–99)
GLUCOSE UR STRIP-MCNC: NEGATIVE MG/DL
HCT VFR BLD AUTO: 42.6 % (ref 34–46.6)
HGB BLD-MCNC: 13.8 G/DL (ref 12–15.9)
HGB UR QL STRIP.AUTO: ABNORMAL
HYALINE CASTS UR QL AUTO: ABNORMAL /LPF
IMM GRANULOCYTES # BLD AUTO: 0.18 10*3/MM3 (ref 0–0.05)
IMM GRANULOCYTES NFR BLD AUTO: 1.4 % (ref 0–0.5)
KETONES UR QL STRIP: NEGATIVE
LEUKOCYTE ESTERASE UR QL STRIP.AUTO: ABNORMAL
LYMPHOCYTES # BLD AUTO: 3.99 10*3/MM3 (ref 0.7–3.1)
LYMPHOCYTES NFR BLD AUTO: 30.4 % (ref 19.6–45.3)
MAGNESIUM SERPL-MCNC: 2.3 MG/DL (ref 1.6–2.6)
MCH RBC QN AUTO: 30 PG (ref 26.6–33)
MCHC RBC AUTO-ENTMCNC: 32.4 G/DL (ref 31.5–35.7)
MCV RBC AUTO: 92.6 FL (ref 79–97)
MONOCYTES # BLD AUTO: 0.73 10*3/MM3 (ref 0.1–0.9)
MONOCYTES NFR BLD AUTO: 5.6 % (ref 5–12)
NEUTROPHILS NFR BLD AUTO: 61.6 % (ref 42.7–76)
NEUTROPHILS NFR BLD AUTO: 8.08 10*3/MM3 (ref 1.7–7)
NITRITE UR QL STRIP: POSITIVE
NRBC BLD AUTO-RTO: 0 /100 WBC (ref 0–0.2)
PH UR STRIP.AUTO: 6 [PH] (ref 5–8)
PLATELET # BLD AUTO: 260 10*3/MM3 (ref 140–450)
PMV BLD AUTO: 8.7 FL (ref 6–12)
POTASSIUM SERPL-SCNC: 3.6 MMOL/L (ref 3.5–5.2)
PROT SERPL-MCNC: 6.4 G/DL (ref 6–8.5)
PROT UR QL STRIP: ABNORMAL
RBC # BLD AUTO: 4.6 10*6/MM3 (ref 3.77–5.28)
RBC # UR STRIP: ABNORMAL /HPF
REF LAB TEST METHOD: ABNORMAL
SODIUM SERPL-SCNC: 138 MMOL/L (ref 136–145)
SP GR UR STRIP: 1.01 (ref 1–1.03)
SQUAMOUS #/AREA URNS HPF: ABNORMAL /HPF
UROBILINOGEN UR QL STRIP: ABNORMAL
WBC # UR STRIP: ABNORMAL /HPF
WBC NRBC COR # BLD AUTO: 13.11 10*3/MM3 (ref 3.4–10.8)

## 2024-08-29 PROCEDURE — 83735 ASSAY OF MAGNESIUM: CPT | Performed by: NURSE PRACTITIONER

## 2024-08-29 PROCEDURE — 81001 URINALYSIS AUTO W/SCOPE: CPT | Performed by: NURSE PRACTITIONER

## 2024-08-29 PROCEDURE — P9612 CATHETERIZE FOR URINE SPEC: HCPCS

## 2024-08-29 PROCEDURE — 99284 EMERGENCY DEPT VISIT MOD MDM: CPT

## 2024-08-29 PROCEDURE — 80053 COMPREHEN METABOLIC PANEL: CPT | Performed by: NURSE PRACTITIONER

## 2024-08-29 PROCEDURE — 85025 COMPLETE CBC W/AUTO DIFF WBC: CPT | Performed by: NURSE PRACTITIONER

## 2024-08-29 PROCEDURE — 70450 CT HEAD/BRAIN W/O DYE: CPT

## 2024-08-29 PROCEDURE — 72125 CT NECK SPINE W/O DYE: CPT

## 2024-08-29 RX ORDER — SODIUM CHLORIDE 0.9 % (FLUSH) 0.9 %
10 SYRINGE (ML) INJECTION AS NEEDED
Status: DISCONTINUED | OUTPATIENT
Start: 2024-08-29 | End: 2024-08-29 | Stop reason: HOSPADM

## 2024-08-29 NOTE — DISCHARGE INSTRUCTIONS
Keep your appointment with your neurologist on Tuesday  Return to the ED for new or worsening symptoms  Follow-up with your primary care provider, call for appointment  Take antibiotics to completion

## 2024-08-29 NOTE — ED PROVIDER NOTES
Subjective   Chief Complaint   Patient presents with    Numbness     Right side numbness, states her whole body on right side in numb started yesterday morning.         History of Present Illness  Patient is a 34 old female presents emergency department for evaluation of right-sided numbness.  She reports that she has been having numbness in her right leg over the past month, she reports that yesterday morning she woke up and noticed she was having numbness in her right arm, right side of her neck and right side of her face.  She reports that sometimes she feels weak in the right extremity.  She has any headache or visual changes.  No speech disturbances, no visual disturbances, no difficulty walking.  Denies any back or neck pain.  No fevers.  No trauma injury or falls.  No IVDA  Review of Systems  Per HPI  No past medical history on file.    Allergies   Allergen Reactions    Metformin Diarrhea       No past surgical history on file.    No family history on file.    Social History     Socioeconomic History    Marital status: Single   Tobacco Use    Smoking status: Never    Smokeless tobacco: Never    Tobacco comments:     N/A   Vaping Use    Vaping status: Never Used   Substance and Sexual Activity    Alcohol use: Not Currently    Drug use: Never    Sexual activity: Defer           Objective   Physical Exam  Vitals and nursing note reviewed.   Constitutional:       Appearance: Normal appearance.   HENT:      Head: Normocephalic and atraumatic.      Nose: Nose normal.      Mouth/Throat:      Mouth: Mucous membranes are moist.      Pharynx: Oropharynx is clear.   Eyes:      Extraocular Movements: Extraocular movements intact.      Conjunctiva/sclera: Conjunctivae normal.      Pupils: Pupils are equal, round, and reactive to light.   Cardiovascular:      Rate and Rhythm: Normal rate and regular rhythm.      Heart sounds: Normal heart sounds. No murmur heard.     No friction rub. No gallop.   Pulmonary:      Effort:  Pulmonary effort is normal.      Breath sounds: Normal breath sounds.   Abdominal:      General: Bowel sounds are normal.      Palpations: Abdomen is soft.   Musculoskeletal:         General: Normal range of motion.      Cervical back: Normal range of motion and neck supple.      Right lower leg: No edema.      Left lower leg: No edema.   Skin:     General: Skin is warm and dry.      Capillary Refill: Capillary refill takes less than 2 seconds.      Findings: No erythema or rash.   Neurological:      General: No focal deficit present.      Mental Status: She is alert and oriented to person, place, and time.      Cranial Nerves: No dysarthria or facial asymmetry.      Sensory: Sensation is intact.      Motor: Motor function is intact. No weakness, tremor or pronator drift.         Procedures           ED Course  ED Course as of 08/29/24 1942   Thu Aug 29, 2024   1639 CT Cervical Spine Without Contrast [LB]      ED Course User Index  [LB] hCeryl Raygoza MohamudWILLOW      Vitals:    08/29/24 1715   BP: 133/84   Pulse: 104   Resp:    Temp:    SpO2: 91%     Medications - No data to display                                           Medical Decision Making  Problems Addressed:  Acute UTI: complicated acute illness or injury  Paresthesia: complicated acute illness or injury    Amount and/or Complexity of Data Reviewed  Labs: ordered.  Radiology: ordered. Decision-making details documented in ED Course.    Risk  Prescription drug management.    Discussed with Dr. Duong  Chart Review: Discharge summary 7/13 2024  Imaging: CT Head Without Contrast    Result Date: 8/29/2024  Impression: No acute intracranial abnormality. No acute abnormality of the cervical spine. Straightening of normal lordosis is likely positional Electronically Signed: Marquez Montero MD  8/29/2024 4:37 PM EDT  Workstation ID: [id number]    CT Cervical Spine Without Contrast    Result Date: 8/29/2024  Impression: No acute intracranial abnormality. No acute  abnormality of the cervical spine. Straightening of normal lordosis is likely positional Electronically Signed: Marquez Montero MD  8/29/2024 4:37 PM EDT  Workstation ID: [id number]       Pt was Placed on appropriate monitoring.  Differential diagnoses considered for patient presentation, this list is not all inclusive of diagnoses considered: Electrolyte imbalance, stroke, radiculopathy  Patient presents to the ED for the above complaint, underwent the above, exam and workup notable for, nonfocal neuroexam.  CT imaging is negative.  Patient also had previous MRI of the C-spine which was negative.  Will treat UTI.  She has a follow-up appointment with her neurologist scheduled.  Doubt stroke.  Disposition: I discussed my findings, plan of care, discharge instructions, the importance of follow up with their PCP/ and or specialist for repeat evaluation and to discuss any abnormal findings in labs or imaging that warrant further outpatient evaluation. We discussed that although a definitive diagnosis is not always found in the ED, it is believed emergent conditions have been ruled out, and patient is safe for discharge at this time.  We discussed return precautions for the emergency department.  Patient verbalizes understandings, and agrees with current plan of care.  Note Disclaimer: At [facility], we believe that sharing information builds trust and better relationships. You are receiving this note because you recently visited [facility]. It is possible you will see health information before a provider has talked with you about it. This kind of information can be easy to misunderstand. To help you fully understand what it means for your health, we urge you to discuss this note with your provider  Note dictated utilizing Dragon Dictation.  Appropriate PPE worn during patient interactions.      Final diagnoses:   Paresthesia   Acute UTI       ED Disposition  ED Disposition       ED Disposition   Discharge     Condition   Stable    Comment   --               Alexandrea Ortiz, APRN  [address] [facility] [place] [address] [phone]          [facility] EMERGENCY DEPARTMENT  [address] [facility] [address] [phone]             Medication List        New Prescriptions      amoxicillin-clavulanate 875-125 MG per tablet  Commonly known as: AUGMENTIN  Take 1 tablet by mouth 2 (Two) Times a Day for 5 days.               Where to Get Your Medications        These medications were sent to [facility]/pharmacy #[id number] - [place], [place] - [address] [facility] - [phone] [facility] [phone]   [address] [facility] [place] [address]      Phone: [phone]   amoxicillin-clavulanate 875-125 MG per tablet            Cheryl Raygoza, WILLOW  08/29/24 1942